# Patient Record
Sex: MALE | Race: WHITE | Employment: FULL TIME | URBAN - METROPOLITAN AREA
[De-identification: names, ages, dates, MRNs, and addresses within clinical notes are randomized per-mention and may not be internally consistent; named-entity substitution may affect disease eponyms.]

---

## 2017-09-18 ENCOUNTER — OFFICE VISIT (OUTPATIENT)
Dept: FAMILY MEDICINE CLINIC | Age: 57
End: 2017-09-18

## 2017-09-18 VITALS
DIASTOLIC BLOOD PRESSURE: 86 MMHG | SYSTOLIC BLOOD PRESSURE: 122 MMHG | RESPIRATION RATE: 12 BRPM | TEMPERATURE: 97.8 F | BODY MASS INDEX: 33.16 KG/M2 | WEIGHT: 244.8 LBS | HEART RATE: 86 BPM | HEIGHT: 72 IN | OXYGEN SATURATION: 98 %

## 2017-09-18 DIAGNOSIS — Z99.89 OSA ON CPAP: ICD-10-CM

## 2017-09-18 DIAGNOSIS — R63.5 WEIGHT GAIN: ICD-10-CM

## 2017-09-18 DIAGNOSIS — R53.82 CHRONIC FATIGUE: ICD-10-CM

## 2017-09-18 DIAGNOSIS — Z12.5 PROSTATE CANCER SCREENING: ICD-10-CM

## 2017-09-18 DIAGNOSIS — G47.33 OSA ON CPAP: ICD-10-CM

## 2017-09-18 DIAGNOSIS — Z13.220 SCREENING CHOLESTEROL LEVEL: ICD-10-CM

## 2017-09-18 DIAGNOSIS — Z00.00 PREVENTATIVE HEALTH CARE: Primary | ICD-10-CM

## 2017-09-18 LAB
A/G RATIO: 1.9 (ref 1.1–2.2)
ALBUMIN SERPL-MCNC: 4.5 G/DL (ref 3.4–5)
ALP BLD-CCNC: 83 U/L (ref 40–129)
ALT SERPL-CCNC: 41 U/L (ref 10–40)
ANION GAP SERPL CALCULATED.3IONS-SCNC: 13 MMOL/L (ref 3–16)
AST SERPL-CCNC: 24 U/L (ref 15–37)
BASOPHILS ABSOLUTE: 0.1 K/UL (ref 0–0.2)
BASOPHILS RELATIVE PERCENT: 0.9 %
BILIRUB SERPL-MCNC: 0.7 MG/DL (ref 0–1)
BUN BLDV-MCNC: 15 MG/DL (ref 7–20)
CALCIUM SERPL-MCNC: 9.3 MG/DL (ref 8.3–10.6)
CHLORIDE BLD-SCNC: 99 MMOL/L (ref 99–110)
CHOLESTEROL, TOTAL: 184 MG/DL (ref 0–199)
CO2: 26 MMOL/L (ref 21–32)
CREAT SERPL-MCNC: 0.8 MG/DL (ref 0.9–1.3)
EOSINOPHILS ABSOLUTE: 0.2 K/UL (ref 0–0.6)
EOSINOPHILS RELATIVE PERCENT: 3.5 %
GFR AFRICAN AMERICAN: >60
GFR NON-AFRICAN AMERICAN: >60
GLOBULIN: 2.4 G/DL
GLUCOSE BLD-MCNC: 112 MG/DL (ref 70–99)
HCT VFR BLD CALC: 46.4 % (ref 40.5–52.5)
HDLC SERPL-MCNC: 60 MG/DL (ref 40–60)
HEMOGLOBIN: 15.8 G/DL (ref 13.5–17.5)
LDL CHOLESTEROL CALCULATED: 102 MG/DL
LYMPHOCYTES ABSOLUTE: 1.2 K/UL (ref 1–5.1)
LYMPHOCYTES RELATIVE PERCENT: 18.8 %
MCH RBC QN AUTO: 31.5 PG (ref 26–34)
MCHC RBC AUTO-ENTMCNC: 34.2 G/DL (ref 31–36)
MCV RBC AUTO: 92.4 FL (ref 80–100)
MONOCYTES ABSOLUTE: 0.6 K/UL (ref 0–1.3)
MONOCYTES RELATIVE PERCENT: 9.1 %
NEUTROPHILS ABSOLUTE: 4.4 K/UL (ref 1.7–7.7)
NEUTROPHILS RELATIVE PERCENT: 67.7 %
PDW BLD-RTO: 13 % (ref 12.4–15.4)
PLATELET # BLD: 180 K/UL (ref 135–450)
PMV BLD AUTO: 8.7 FL (ref 5–10.5)
POTASSIUM SERPL-SCNC: 4.9 MMOL/L (ref 3.5–5.1)
PROSTATE SPECIFIC ANTIGEN: 0.53 NG/ML (ref 0–4)
RBC # BLD: 5.02 M/UL (ref 4.2–5.9)
SODIUM BLD-SCNC: 138 MMOL/L (ref 136–145)
T4 FREE: 1.1 NG/DL (ref 0.9–1.8)
TOTAL PROTEIN: 6.9 G/DL (ref 6.4–8.2)
TRIGL SERPL-MCNC: 110 MG/DL (ref 0–150)
TSH REFLEX: 4.06 UIU/ML (ref 0.27–4.2)
VLDLC SERPL CALC-MCNC: 22 MG/DL
WBC # BLD: 6.5 K/UL (ref 4–11)

## 2017-09-18 PROCEDURE — 99396 PREV VISIT EST AGE 40-64: CPT | Performed by: NURSE PRACTITIONER

## 2017-09-18 RX ORDER — MAG HYDROX/ALUMINUM HYD/SIMETH 400-400-40
SUSPENSION, ORAL (FINAL DOSE FORM) ORAL
COMMUNITY
End: 2018-03-21

## 2017-09-18 RX ORDER — BIOTIN 1 MG
TABLET ORAL
COMMUNITY
End: 2018-03-21

## 2017-09-18 RX ORDER — CALCIUM CARBONATE 500(1250)
500 TABLET ORAL DAILY
COMMUNITY
End: 2018-03-21

## 2017-09-18 ASSESSMENT — ENCOUNTER SYMPTOMS
BACK PAIN: 0
CHEST TIGHTNESS: 0
CONSTIPATION: 0
NAUSEA: 0
APNEA: 1
SHORTNESS OF BREATH: 0

## 2017-09-18 ASSESSMENT — PATIENT HEALTH QUESTIONNAIRE - PHQ9
2. FEELING DOWN, DEPRESSED OR HOPELESS: 0
SUM OF ALL RESPONSES TO PHQ QUESTIONS 1-9: 0
1. LITTLE INTEREST OR PLEASURE IN DOING THINGS: 0
SUM OF ALL RESPONSES TO PHQ9 QUESTIONS 1 & 2: 0
SUM OF ALL RESPONSES TO PHQ9 QUESTIONS 1 & 2: 0
SUM OF ALL RESPONSES TO PHQ QUESTIONS 1-9: 0
2. FEELING DOWN, DEPRESSED OR HOPELESS: 0
1. LITTLE INTEREST OR PLEASURE IN DOING THINGS: 0

## 2017-09-20 ENCOUNTER — TELEPHONE (OUTPATIENT)
Dept: FAMILY MEDICINE CLINIC | Age: 57
End: 2017-09-20

## 2017-12-08 ENCOUNTER — OFFICE VISIT (OUTPATIENT)
Dept: PULMONOLOGY | Age: 57
End: 2017-12-08

## 2017-12-08 VITALS
DIASTOLIC BLOOD PRESSURE: 100 MMHG | WEIGHT: 250 LBS | HEART RATE: 78 BPM | SYSTOLIC BLOOD PRESSURE: 158 MMHG | BODY MASS INDEX: 33.86 KG/M2 | RESPIRATION RATE: 16 BRPM | HEIGHT: 72 IN | OXYGEN SATURATION: 98 %

## 2017-12-08 DIAGNOSIS — Z99.89 OSA ON CPAP: Primary | ICD-10-CM

## 2017-12-08 DIAGNOSIS — G47.33 OSA ON CPAP: Primary | ICD-10-CM

## 2017-12-08 DIAGNOSIS — R03.0 ELEVATED BLOOD PRESSURE READING: ICD-10-CM

## 2017-12-08 DIAGNOSIS — Z71.89 CPAP USE COUNSELING: ICD-10-CM

## 2017-12-08 PROCEDURE — 99213 OFFICE O/P EST LOW 20 MIN: CPT | Performed by: NURSE PRACTITIONER

## 2017-12-08 ASSESSMENT — SLEEP AND FATIGUE QUESTIONNAIRES
HOW LIKELY ARE YOU TO NOD OFF OR FALL ASLEEP WHILE SITTING QUIETLY AFTER LUNCH WITHOUT ALCOHOL: 0
HOW LIKELY ARE YOU TO NOD OFF OR FALL ASLEEP WHILE SITTING AND TALKING TO SOMEONE: 0
HOW LIKELY ARE YOU TO NOD OFF OR FALL ASLEEP WHILE SITTING AND READING: 1
HOW LIKELY ARE YOU TO NOD OFF OR FALL ASLEEP WHILE LYING DOWN TO REST IN THE AFTERNOON WHEN CIRCUMSTANCES PERMIT: 1
HOW LIKELY ARE YOU TO NOD OFF OR FALL ASLEEP IN A CAR, WHILE STOPPED FOR A FEW MINUTES IN TRAFFIC: 0
HOW LIKELY ARE YOU TO NOD OFF OR FALL ASLEEP WHILE WATCHING TV: 0
HOW LIKELY ARE YOU TO NOD OFF OR FALL ASLEEP WHILE SITTING INACTIVE IN A PUBLIC PLACE: 0
ESS TOTAL SCORE: 2
HOW LIKELY ARE YOU TO NOD OFF OR FALL ASLEEP WHEN YOU ARE A PASSENGER IN A CAR FOR AN HOUR WITHOUT A BREAK: 0
NECK CIRCUMFERENCE (INCHES): 16

## 2017-12-08 NOTE — PATIENT INSTRUCTIONS
Please keep all of your future appointments scheduled by [address], [place] [facility] office. Sleep Hygiene. .. Tips for better sleep. .. Avoid naps. This will ensure you are sleepy at bedtime. If you have to take a nap, sleep less than 1 hour, before 3 pm.  Sleep only when sleepy; this reduces the time you are awake in bed. Regular exercise is recommended to help you deepen your sleep, but not within 4-6 hours of your bedtime. Timing of exercise is important, aim to exercise early in the morning or early afternoon. A light snack may help you fall asleep. Warm milk and foods high in the amino acid tryptophan, such as bananas, may help you to sleep  Be sure to avoid heavy, spicy or sugary foods 4-6 hours before bedtime and avoid at snack time. Stay away from stimulants such as caffeine and nicotine for at least 4-6 hours before bed. Stimulants can interfere with your ability to fall asleep. Caffeine is found in tea, cola, coffee, cocoa and chocolate and is best avoided at bedtime. Nicotine is found in tobacco products. Avoid alcohol 4-6 hours before bedtime. Alcohol has an immediate sleep-inducing effect, after a few hours when alcohol levels fall there is a stimulant or wake-up effect and will cause fragmented sleep. Sleep rituals are important. Give your body clues it is time to slow down and sleep. Examples include; yoga, deep breathing, listen to relaxing music, a hot bath or a few minutes of reading. Have a fixed bedtime and awakening time, Even on weekends! You will feel better keeping a regular sleep cycle, even if you are retired or not working. Get into your favorite sleep position. If not asleep in 30 minutes, get up and do something boring until you feel sleepy. Remember not to expose yourself to bright lights such as TV, phone or tablet screens. Only use your bed for sleeping. Do not use your bed as an office, workroom or recreation room. Use comfortable bedding. dryer  - Don't use any caustic or household cleaning solutions such as bleach on your CPAP   equipment.  - Do follow the recommended cleaning schedule. - Do change your disposable filter frequently. Adapted From: [email or web address]/cleaning. shtm.   These are general suggestions for all models please follow specific s recommendations and specific instructions

## 2017-12-08 NOTE — PROGRESS NOTES
COLONOSCOPY  07/25/2016    normal other than diverticulosis    HERNIA REPAIR      LAPAROSCOPY  08/15/2016    Lap repair incarcerated umbilical hernia with implantation 4.5\" diameter Ventralight ST mesh with dr Mery Gutiérrez       Allergies:  is allergic to pcn [penicillins]. Social History:    TOBACCO:   reports that he quit smoking about 6 years ago. His smoking use included Cigarettes. He has a 30.00 pack-year smoking history. He has never used smokeless tobacco.  ETOH:   reports that he drinks about 84.6 oz of alcohol per week . Family History:       Problem Relation Age of Onset    Cancer Sister      Breast    Thyroid Disease Sister     Cancer Mother      Colon    High Blood Pressure Mother     Alzheimer's Disease Father     Parkinsonism Father     High Blood Pressure Father     Emphysema Neg Hx     Heart Failure Neg Hx        Current Medications:    Current Outpatient Prescriptions:     Biotin 1000 MCG TABS, Take by mouth, Disp: , Rfl:     Saw [place] 450 MG CAPS, Take by mouth, Disp: , Rfl:     calcium carbonate (OSCAL) 500 MG TABS tablet, Take 500 mg by mouth daily, Disp: , Rfl:     aspirin 81 MG tablet, Take 81 mg by mouth daily, Disp: , Rfl:       Objective:   PHYSICAL EXAM:    BP (!) 158/100 (Site: Left Arm, Position: Sitting, Cuff Size: Medium Adult)   Pulse 78   Resp 16   Ht 6' (1.829 m)   Wt 250 lb (113.4 kg) Comment: Stated  SpO2 98%   BMI 33.91 kg/m²     Physical exam:  Gen: No distress. Obese. BMI of 33.20  Eyes: PERRL. No sclera icterus. No conjunctival injection. ENT: No discharge. Pharynx clear. Mallampati class IV. Large tongue   Neck: Trachea midline. No obvious mass. Neck circumference 16\"  Resp: No accessory muscle use. No crackles. No wheezes. No rhonchi. CV: Regular rate. Regular rhythm. No murmur or rub. Skin: Warm and dry. M/S: No cyanosis. No obvious joint deformity. Neuro: Awake. Alert. Moves all four extremities.    Psych:

## 2018-03-21 ENCOUNTER — OFFICE VISIT (OUTPATIENT)
Dept: FAMILY MEDICINE CLINIC | Age: 58
End: 2018-03-21

## 2018-03-21 VITALS
WEIGHT: 241.6 LBS | HEART RATE: 87 BPM | DIASTOLIC BLOOD PRESSURE: 78 MMHG | OXYGEN SATURATION: 97 % | RESPIRATION RATE: 16 BRPM | HEIGHT: 72 IN | SYSTOLIC BLOOD PRESSURE: 128 MMHG | BODY MASS INDEX: 32.72 KG/M2

## 2018-03-21 DIAGNOSIS — G47.33 OSA ON CPAP: ICD-10-CM

## 2018-03-21 DIAGNOSIS — Z99.89 OSA ON CPAP: ICD-10-CM

## 2018-03-21 DIAGNOSIS — L60.3 SPLITTING OF NAIL: ICD-10-CM

## 2018-03-21 DIAGNOSIS — R73.01 IMPAIRED FASTING GLUCOSE: Primary | ICD-10-CM

## 2018-03-21 LAB — HBA1C MFR BLD: 5.8 %

## 2018-03-21 PROCEDURE — 83036 HEMOGLOBIN GLYCOSYLATED A1C: CPT | Performed by: NURSE PRACTITIONER

## 2018-03-21 PROCEDURE — 99213 OFFICE O/P EST LOW 20 MIN: CPT | Performed by: NURSE PRACTITIONER

## 2018-03-21 ASSESSMENT — ENCOUNTER SYMPTOMS
CHEST TIGHTNESS: 0
NAUSEA: 0
SHORTNESS OF BREATH: 0
CONSTIPATION: 0

## 2018-03-21 ASSESSMENT — PATIENT HEALTH QUESTIONNAIRE - PHQ9
1. LITTLE INTEREST OR PLEASURE IN DOING THINGS: 0
2. FEELING DOWN, DEPRESSED OR HOPELESS: 0
SUM OF ALL RESPONSES TO PHQ9 QUESTIONS 1 & 2: 0
SUM OF ALL RESPONSES TO PHQ QUESTIONS 1-9: 0

## 2018-06-21 ENCOUNTER — OFFICE VISIT (OUTPATIENT)
Dept: FAMILY MEDICINE CLINIC | Age: 58
End: 2018-06-21

## 2018-06-21 VITALS
SYSTOLIC BLOOD PRESSURE: 116 MMHG | BODY MASS INDEX: 31.99 KG/M2 | OXYGEN SATURATION: 97 % | HEIGHT: 72 IN | DIASTOLIC BLOOD PRESSURE: 80 MMHG | RESPIRATION RATE: 15 BRPM | HEART RATE: 85 BPM | WEIGHT: 236.2 LBS

## 2018-06-21 DIAGNOSIS — R73.01 IMPAIRED FASTING GLUCOSE: Primary | ICD-10-CM

## 2018-06-21 DIAGNOSIS — F10.10 ALCOHOL ABUSE: ICD-10-CM

## 2018-06-21 DIAGNOSIS — E66.09 CLASS 1 OBESITY DUE TO EXCESS CALORIES WITH SERIOUS COMORBIDITY AND BODY MASS INDEX (BMI) OF 32.0 TO 32.9 IN ADULT: ICD-10-CM

## 2018-06-21 DIAGNOSIS — E03.9 ACQUIRED HYPOTHYROIDISM: ICD-10-CM

## 2018-06-21 PROBLEM — E66.811 CLASS 1 OBESITY DUE TO EXCESS CALORIES WITH SERIOUS COMORBIDITY AND BODY MASS INDEX (BMI) OF 32.0 TO 32.9 IN ADULT: Status: ACTIVE | Noted: 2018-06-21

## 2018-06-21 LAB — HBA1C MFR BLD: 5.5 %

## 2018-06-21 PROCEDURE — 83036 HEMOGLOBIN GLYCOSYLATED A1C: CPT | Performed by: NURSE PRACTITIONER

## 2018-06-21 PROCEDURE — 99213 OFFICE O/P EST LOW 20 MIN: CPT | Performed by: NURSE PRACTITIONER

## 2018-06-21 RX ORDER — BIOTIN 10000 MCG
CAPSULE ORAL
COMMUNITY
End: 2021-03-19

## 2018-06-21 ASSESSMENT — PATIENT HEALTH QUESTIONNAIRE - PHQ9
2. FEELING DOWN, DEPRESSED OR HOPELESS: 0
SUM OF ALL RESPONSES TO PHQ QUESTIONS 1-9: 0
1. LITTLE INTEREST OR PLEASURE IN DOING THINGS: 0
SUM OF ALL RESPONSES TO PHQ9 QUESTIONS 1 & 2: 0

## 2018-06-21 ASSESSMENT — ENCOUNTER SYMPTOMS
NAUSEA: 0
CHEST TIGHTNESS: 0
BACK PAIN: 0

## 2018-07-06 PROBLEM — F10.10 ALCOHOL ABUSE: Status: ACTIVE | Noted: 2018-07-06

## 2018-12-10 ENCOUNTER — OFFICE VISIT (OUTPATIENT)
Dept: PULMONOLOGY | Age: 58
End: 2018-12-10
Payer: COMMERCIAL

## 2018-12-10 ENCOUNTER — TELEPHONE (OUTPATIENT)
Dept: PULMONOLOGY | Age: 58
End: 2018-12-10

## 2018-12-10 VITALS
TEMPERATURE: 98.2 F | HEART RATE: 67 BPM | DIASTOLIC BLOOD PRESSURE: 78 MMHG | HEIGHT: 72 IN | OXYGEN SATURATION: 98 % | RESPIRATION RATE: 16 BRPM | BODY MASS INDEX: 32.78 KG/M2 | SYSTOLIC BLOOD PRESSURE: 136 MMHG | WEIGHT: 242 LBS

## 2018-12-10 DIAGNOSIS — E66.9 OBESITY (BMI 30.0-34.9): ICD-10-CM

## 2018-12-10 DIAGNOSIS — Z71.89 CPAP USE COUNSELING: ICD-10-CM

## 2018-12-10 DIAGNOSIS — Z99.89 OSA ON CPAP: Primary | ICD-10-CM

## 2018-12-10 DIAGNOSIS — G47.33 OSA ON CPAP: Primary | ICD-10-CM

## 2018-12-10 DIAGNOSIS — Z72.821 POOR SLEEP HYGIENE: ICD-10-CM

## 2018-12-10 PROBLEM — E66.811 OBESITY (BMI 30.0-34.9): Status: ACTIVE | Noted: 2018-12-10

## 2018-12-10 PROCEDURE — 99214 OFFICE O/P EST MOD 30 MIN: CPT | Performed by: NURSE PRACTITIONER

## 2018-12-10 ASSESSMENT — SLEEP AND FATIGUE QUESTIONNAIRES
HOW LIKELY ARE YOU TO NOD OFF OR FALL ASLEEP WHILE WATCHING TV: 1
HOW LIKELY ARE YOU TO NOD OFF OR FALL ASLEEP WHILE LYING DOWN TO REST IN THE AFTERNOON WHEN CIRCUMSTANCES PERMIT: 3
HOW LIKELY ARE YOU TO NOD OFF OR FALL ASLEEP WHILE SITTING INACTIVE IN A PUBLIC PLACE: 0
NECK CIRCUMFERENCE (INCHES): 16
HOW LIKELY ARE YOU TO NOD OFF OR FALL ASLEEP WHILE SITTING AND READING: 1
HOW LIKELY ARE YOU TO NOD OFF OR FALL ASLEEP WHILE SITTING AND TALKING TO SOMEONE: 0
HOW LIKELY ARE YOU TO NOD OFF OR FALL ASLEEP WHILE SITTING QUIETLY AFTER LUNCH WITHOUT ALCOHOL: 0
HOW LIKELY ARE YOU TO NOD OFF OR FALL ASLEEP WHEN YOU ARE A PASSENGER IN A CAR FOR AN HOUR WITHOUT A BREAK: 0
HOW LIKELY ARE YOU TO NOD OFF OR FALL ASLEEP IN A CAR, WHILE STOPPED FOR A FEW MINUTES IN TRAFFIC: 0
ESS TOTAL SCORE: 5

## 2018-12-10 NOTE — PROGRESS NOTES
arise.  - Sleep hygiene  - Avoid sedatives, alcohol and caffeinated drinks at bed time.  -Advised to cut down/stop daily alcohol use. Discussed effects on alcohol and sleep- initially can worsen JOAQUÍN, and then can cause decrease in total sleep time and increased wakefulness in the second half of sleep  - Patient counseled to never drive or operate heavy machinery while fatigue, drowsy or sleepy. - Weight loss is recommended as a long-term intervention.    - Complications of JOAQUÍN if not treated were discussed with patient patient, including: systemic hypertension, pulmonary hypertension, cardiovascular morbidities, car accidents and all cause mortality.  -Patient education handout provided regarding sleep tips and CPAP cleaning recommendations       Follow up:recommend 3 months, sooner if needed (pt prefers 6 months)    More than 25 minutes of time was spent in direct patient contact during this visit, more than 50% of which was spent counseling and/or coordinating care.

## 2018-12-10 NOTE — PATIENT INSTRUCTIONS
Please keep all of your future appointments scheduled by [address], [facility] ([facility]) Pulmonary and Sleep. Remember to bring your sleep machine, cord and mask to each appointment    You should have a follow up appointment scheduled with a sleep medicine provider within 12 months. Routine parts, masks, tubing and filters should all be obtainable from your DME (equipment) provider. Any problems related to mask fit, comfort or functioning of equipment should also be addressed directly with your DME provider. If you are unable to resolve problems, then please notify our office and schedule an appointment to be seen sooner than the usual follow up appointment. For all patients who are evaluated for sleep disorders, never drive or operate motorized equipment while sleepy, fatigued or groggy. Please bring in all of your sleep equipment to all of your appointments, including machine, mask, cords and hoses. Here are some tips to to getting better sleep  1- Avoid napping during the day: This will ensure you are tired at bedtime. If you have to take a nap, sleep less than one hour, before 3 pm.   2- Exercise regularly, but not right before bed: but the timing of the workout is important. Exercising in the morning or early afternoon will not interfere with sleep. Exercising within two hours before bedtime can decrease your ability to fall asleep. Regular exercise is recommended to help you deepen the sleep. 3- Avoid heavy, spicy, or sugary foods 4-6 hours before bedtime: These can affect your ability to stay asleep. 4- Have a light snack before bed: Having an empty stomach can interfere with your sleep. Dairy products and turkey contain tryptophan, which acts as a natural sleep inducer. 5- Stay away from caffeine, nicotine and alcohol at least 4-6 hours before bed: Caffeine and nicotine are stimulants that interfere with your ability to fall asleep.  While alcohol has an immediate sleep-inducing worries about job, school, daily life, etc., behind when you go to bed. Some people find it useful to assign a \"worry period\" during the evening or afternoon for these issues. CPAP Equipment Cleaning and Disinfecting Schedule  Equipment Cleaning Frequency Instructions  Disinfecting Frequency   Non-Disposable Filters  Weekly Mild soapy water, Rinse, Air Dry Not Required   Disposable Filters Change as needed  2-4 weeks Do Not Wash Not Required   Hose/tubing Daily Mild soapy water, Rinse, Air Dry Once a week   Mask / Nasal Pillows Daily Mild soapy water, Rinse, Air Dry Once a week   Headgear Weekly Hand wash, Mild soapy water, Rinse, Dry  Not Required   Humidifier Daily Empty water daily  Mild soapy water, Rinse well, Air Dry  Once a week   CPAP Unit As Needed Dust with damp cloth,  No detergents or sprays Not Required         Disinfect (per schedule) with 1 part white vinegar and 3 parts water- soak mask and water chamber for 30 minutes every 1-2 weeks, more often if sick. Allow water/vinegar mixture to run through tubing. Allow all equipment to air dry. Drying Hints:   Always hang tubing away from direct sunlight, as this will cause the tubing to become yellow, brittle and crack over a period of time. DO NOT attach the wet tubing to your CPAP unit to blow-dry it. The moisture from the tubing can drain back into your machine. Moisture in your unit can cause sudden pressure increases or short circuits  DO's and DON'Ts:  - Don't use alcohol-based products to clean your mask, because it can cause the materials to become hard and brittle. - Don't put headgear in the washer or dryer  - Don't use any caustic or household cleaning solutions such as bleach on your CPAP   equipment.  - Do follow the recommended cleaning schedule. - Do change your disposable filter frequently. Adapted From: [employer]/cleaning. shtm.   These are general suggestions for all models please follow specific s recommendations and specific instructions

## 2019-06-10 ENCOUNTER — OFFICE VISIT (OUTPATIENT)
Dept: PULMONOLOGY | Age: 59
End: 2019-06-10
Payer: COMMERCIAL

## 2019-06-10 VITALS
HEIGHT: 72 IN | TEMPERATURE: 98.4 F | SYSTOLIC BLOOD PRESSURE: 126 MMHG | RESPIRATION RATE: 16 BRPM | BODY MASS INDEX: 31.29 KG/M2 | HEART RATE: 67 BPM | DIASTOLIC BLOOD PRESSURE: 83 MMHG | WEIGHT: 231 LBS | OXYGEN SATURATION: 97 %

## 2019-06-10 DIAGNOSIS — Z71.89 CPAP USE COUNSELING: ICD-10-CM

## 2019-06-10 DIAGNOSIS — E66.9 OBESITY (BMI 30.0-34.9): ICD-10-CM

## 2019-06-10 DIAGNOSIS — G47.33 OSA ON CPAP: Primary | ICD-10-CM

## 2019-06-10 DIAGNOSIS — Z99.89 OSA ON CPAP: Primary | ICD-10-CM

## 2019-06-10 PROCEDURE — 99214 OFFICE O/P EST MOD 30 MIN: CPT | Performed by: NURSE PRACTITIONER

## 2019-06-10 ASSESSMENT — SLEEP AND FATIGUE QUESTIONNAIRES
HOW LIKELY ARE YOU TO NOD OFF OR FALL ASLEEP WHEN YOU ARE A PASSENGER IN A CAR FOR AN HOUR WITHOUT A BREAK: 0
HOW LIKELY ARE YOU TO NOD OFF OR FALL ASLEEP WHILE SITTING QUIETLY AFTER LUNCH WITHOUT ALCOHOL: 0
HOW LIKELY ARE YOU TO NOD OFF OR FALL ASLEEP WHILE LYING DOWN TO REST IN THE AFTERNOON WHEN CIRCUMSTANCES PERMIT: 3
HOW LIKELY ARE YOU TO NOD OFF OR FALL ASLEEP WHILE SITTING AND TALKING TO SOMEONE: 0
ESS TOTAL SCORE: 3
HOW LIKELY ARE YOU TO NOD OFF OR FALL ASLEEP IN A CAR, WHILE STOPPED FOR A FEW MINUTES IN TRAFFIC: 0
HOW LIKELY ARE YOU TO NOD OFF OR FALL ASLEEP WHILE SITTING AND READING: 0
HOW LIKELY ARE YOU TO NOD OFF OR FALL ASLEEP WHILE SITTING INACTIVE IN A PUBLIC PLACE: 0
NECK CIRCUMFERENCE (INCHES): 16
HOW LIKELY ARE YOU TO NOD OFF OR FALL ASLEEP WHILE WATCHING TV: 0

## 2019-06-10 NOTE — PATIENT INSTRUCTIONS
.Please keep all of your future appointments scheduled by [facility] office. Out of respect for other patients and providers, you may be asked to reschedule your appointment if you arrive later than your scheduled appointment time. Appointments cancelled less than 24hrs in advance will be considered a no show. Patients with three missed appointments within 1 year or four missed appointments within 2 years can be dismissed from the practice. Remember to bring your sleep machine, cord and mask to each appointment    You should have a follow up appointment scheduled with a sleep medicine provider within 12 months. Routine parts, masks, tubing and filters should all be obtainable from your DME (equipment) provider. Any problems related to mask fit, comfort or functioning of equipment should also be addressed directly with your DME provider. If you are unable to resolve problems, then please notify our office and schedule an appointment to be seen sooner than the usual follow up appointment. For all patients who are evaluated for sleep disorders, never drive or operate motorized equipment while sleepy, fatigued or groggy. Please bring in all of your sleep equipment to all of your appointments, including machine, mask, cords and hoses. Here are some tips to to getting better sleep  1- Avoid napping during the day: This will ensure you are tired at bedtime. If you have to take a nap, sleep less than one hour, before 3 pm.   2- Exercise regularly, but not right before bed: but the timing of the workout is important. Exercising in the morning or early afternoon will not interfere with sleep. Exercising within two hours before bedtime can decrease your ability to fall asleep. Regular exercise is recommended to help you deepen the sleep. 3- Avoid heavy, spicy, or sugary foods 4-6 hours before bedtime: These can affect your ability to stay asleep.   4- Have a light snack along with enough blankets to stay warm is recommended. Get a blackout shade or wear a slumber mask and wear earplugs or get a \"white noise\" machine for light and noise distractions. 14- Use sunlight to set your biological clock: When you get up in the morning, go outside and turn your face to the sun for 15 minutes. 13- Dont take your worries to bed: Leave worries about job, school, daily life, etc., behind when you go to bed. Some people find it useful to assign a \"worry period\" during the evening or afternoon for these issues. CPAP Equipment Cleaning and Disinfecting Schedule  Equipment Cleaning Frequency Instructions  Disinfecting Frequency   Non-Disposable Filters  Weekly Mild soapy water, Rinse, Air Dry Not Required   Disposable Filters Change as needed  2-4 weeks Do Not Wash Not Required   Hose/tubing Daily Mild soapy water, Rinse, Air Dry Once a week   Mask / Nasal Pillows Daily Mild soapy water, Rinse, Air Dry Once a week   Headgear Weekly Hand wash, Mild soapy water, Rinse, Dry  Not Required   Humidifier Daily Empty water daily  Mild soapy water, Rinse well, Air Dry  Once a week   CPAP Unit As Needed Dust with damp cloth,  No detergents or sprays Not Required         Disinfect (per schedule) with 1 part white vinegar and 3 parts water- soak mask and water chamber for 30 minutes every 1-2 weeks, more often if sick. Allow water/vinegar mixture to run through tubing. Allow all equipment to air dry. Drying Hints:   Always hang tubing away from direct sunlight, as this will cause the tubing to become yellow, brittle and crack over a period of time. DO NOT attach the wet tubing to your CPAP unit to blow-dry it. The moisture from the tubing can drain back into your machine. Moisture in your unit can cause sudden pressure increases or short circuits  DO's and DON'Ts:  - Don't use alcohol-based products to clean your mask, because it can cause the materials to become hard and brittle.    - Don't put

## 2019-06-10 NOTE — PROGRESS NOTES
Patient ID: Lourdes Babinski is a 63 y.o. male who is being seen today for   Chief Complaint   Patient presents with    Sleep Apnea     6 mo         HPI:     Lourdes Babinski is a 63 y.o. male in office for JOAQUÍN follow [facility]. States he is still not sleeping in the same room as his wife, which he sates was his main goal.  States the CPAP makes too many noises. CPAP compliance has increased since last visit. He did see Dr. Mor Moses, ENT to discuss Inspire and other surgical options however decided not to pursue that route. Patient is using CPAP 4-5 hrs/night. Using humidifier. No snoring on CPAP. The pressure is well tolerated. The mask is comfortable-full face mask. Some mask leak. No significant daytime sleepiness. No nodding off when driving. No dry nose or throat. No fatigue. Bedtime is MN-1 am pm and rise time is 6 am. Sleep onset is 5-10 minutes. Wakes up 0 times at night total. No nocturia. No naps during the day. No headache in am. No weight gain. 5-6 caffienated beverages during the day. Usually 2-3 beers most days a wek. ESS is 3. Previous HPI 12/10/18  Lourdes Babinski is a 63 y.o. male in office for JOAQUÍN follow up. States he has not been using CPAP much lately states can't sleep in same bed as his wife Beronica Howard it is making noises\". He is unsure if he is snoring or if mask is leaking- from what he is describing the mask is leaking. States he has always had mask fitting issues but is unsure what changed from last visit when he was compliant. He states the only reason he uses CPAP is to be able to sleep in the same bed as his wife. He would like to know other treatment options for CPAP. States he is also having trouble with DME company. Doesn't think he should have to pay so much for supplies. When using CPAP he is using the humidifier. The pressure is well tolerated. The mask is somewhat comfortable- full face F20 with mask liner. +mask leak. No significant daytime sleepiness.  No nodding off when driving. Minimal dry mouth. No fatigue. Bedtime is MN- 1 am and rise time is 7 am. Sleep onset is few minutes falls asleep on the couch and stays there. Wakes up 0 times at night total. 0-1 nocturia. It takes few minutes to fall back a sleep. Rare naps during the day. No headache in am. No weight gain. 3-6 caffienated beverages during the day. 2 beer. ESS is 5. Sleep Medicine 6/10/2019 12/10/2018 12/8/2017 12/27/2016 6/6/2016 12/17/2015   Sitting and reading 0 1 1 1 2 1   Watching TV 0 1 0 1 2 2   Sitting, inactive in a public place (e.g. a theatre or a meeting) 0 0 0 0 0 1   As a passenger in a car for an hour without a break 0 0 0 0 0 0   Lying down to rest in the afternoon when circumstances permit 3 3 1 2 2 3   Sitting and talking to someone 0 0 0 0 0 0   Sitting quietly after a lunch without alcohol 0 0 0 0 2 1   In a car, while stopped for a few minutes in traffic 0 0 0 0 0 0   Total score 3 5 2 4 8 8   Neck circumference 16 16 16 15.5 15.5 15.5       Past Medical History:  Past Medical History:   Diagnosis Date    Acquired hypothyroidism 11/6/2015    Alcohol abuse 7/6/2018    Class 1 obesity due to excess calories with serious comorbidity and body mass index (BMI) of 32.0 to 32.9 in adult 6/21/2018    JOAQUÍN on CPAP        Past Surgical History:        Procedure Laterality Date    COLONOSCOPY  07/25/2016    normal other than diverticulosis    HERNIA REPAIR      LAPAROSCOPY  08/15/2016    Lap repair incarcerated umbilical hernia with implantation 4.5\" diameter Ventralight ST mesh with dr Cinthia Driver       Allergies:  is allergic to pcn [penicillins]. Social History:    TOBACCO:   reports that he quit smoking about 7 years ago. His smoking use included cigarettes. He has a 30.00 pack-year smoking history. He has never used smokeless tobacco.  ETOH:   reports that he drinks about 84.6 oz of alcohol per week.     Family History:       Problem Relation Age of Onset 13-16 cm H2O    Compliance download report from 5/11/19 to 6/9/19 reviewed today by me and showed patient is using machine 4:58 hrs/night with 66.7% compliance and AHI 7.3 within this time frame. 20/30days with greater than 4 hours of machine use. 26/30 days with any CPAP use. Large leak 3 hr 16 min/nightly. 90% pressure 13.1 cm H20 13-16 cmH2O. Assessment:      · Severe JOAQUÍN-Auto CPAP 13-16 CM H2O improving compliance on review today, residual AHI 7.3 likely due to large mask leak  · CPAP mask leak  · Obesity    Plan:     -Continue auto CPAP 13-16 CM H2O  -Can send supply order to DME of patient's choice. -Mask fitting at DME to find mask with better fit- may need to try different mask if cannot get current mask sealed. Patient has no tried different mask yet. -Again discussed recommended cleaning and replacement of CPAP supplies  -Discussed severity of sleep apnea and importance of treatment/CPAP use  -Recommend at least 7, preferably 8 hours of sleep nightly. - Treatment options were discussed with patient including CPAP therapy, oral appliances, hypoglossal nerve stimulator, and upper airways surgery.   -Can send referral to ENT to discuss surgical options per patient preference-patient decided not to pursue surgical option at this point.    - Advised to use CPAP 6-8 hrs at night and during naps- continue to increase use. - Replacement of mask, tubing, head straps every 3-6 months or sooner if damaged. - Patient instructed to contact DME company for any mask, tubing or machine trouble shooting if problems arise.  - Sleep hygiene  - Avoid sedatives, alcohol and caffeinated drinks at bed time.  -Advised to cut down/stop daily alcohol use. Discussed effects on alcohol and sleep- initially can worsen JOAQUÍN, and then can cause decrease in total sleep time and increased wakefulness in the second half of sleep  - Patient counseled to never drive or operate heavy machinery while fatigue, drowsy or sleepy. - Weight loss is recommended as a long-term intervention.    - Complications of JOAQUÍN if not treated were discussed with patient patient, including: systemic hypertension, pulmonary hypertension, cardiovascular morbidities, car accidents and all cause mortality.  -Patient education handout provided regarding sleep tips and CPAP cleaning recommendations       Follow up: 6 months, sooner if needed    More than 25 minutes of time was spent in direct patient contact during this visit, more than 50% of which was spent counseling and/or coordinating care.

## 2019-06-10 NOTE — PROGRESS NOTES
Orders verbalized by Stuart Aguilar CNP;  6 mo  Orders faxed to DME: FF mask, mask fitting  To Call w/ DME Co. Hip pain, acute, left SOB (shortness of breath)

## 2020-01-15 ENCOUNTER — TELEPHONE (OUTPATIENT)
Dept: PULMONOLOGY | Age: 60
End: 2020-01-15

## 2020-02-10 ENCOUNTER — OFFICE VISIT (OUTPATIENT)
Dept: FAMILY MEDICINE CLINIC | Age: 60
End: 2020-02-10
Payer: COMMERCIAL

## 2020-02-10 VITALS
DIASTOLIC BLOOD PRESSURE: 88 MMHG | TEMPERATURE: 98.2 F | RESPIRATION RATE: 16 BRPM | SYSTOLIC BLOOD PRESSURE: 128 MMHG | OXYGEN SATURATION: 98 % | WEIGHT: 239.2 LBS | BODY MASS INDEX: 32.4 KG/M2 | HEART RATE: 75 BPM | HEIGHT: 72 IN

## 2020-02-10 LAB
A/G RATIO: 2.6 (ref 1.1–2.2)
ALBUMIN SERPL-MCNC: 4.6 G/DL (ref 3.4–5)
ALP BLD-CCNC: 90 U/L (ref 40–129)
ALT SERPL-CCNC: 32 U/L (ref 10–40)
ANION GAP SERPL CALCULATED.3IONS-SCNC: 14 MMOL/L (ref 3–16)
AST SERPL-CCNC: 22 U/L (ref 15–37)
BILIRUB SERPL-MCNC: 0.8 MG/DL (ref 0–1)
BUN BLDV-MCNC: 13 MG/DL (ref 7–20)
CALCIUM SERPL-MCNC: 9.2 MG/DL (ref 8.3–10.6)
CHLORIDE BLD-SCNC: 106 MMOL/L (ref 99–110)
CHOLESTEROL, TOTAL: 155 MG/DL (ref 0–199)
CO2: 25 MMOL/L (ref 21–32)
CREAT SERPL-MCNC: 0.7 MG/DL (ref 0.9–1.3)
GFR AFRICAN AMERICAN: >60
GFR NON-AFRICAN AMERICAN: >60
GLOBULIN: 1.8 G/DL
GLUCOSE BLD-MCNC: 106 MG/DL (ref 70–99)
HDLC SERPL-MCNC: 59 MG/DL (ref 40–60)
LDL CHOLESTEROL CALCULATED: 86 MG/DL
POTASSIUM REFLEX MAGNESIUM: 4.7 MMOL/L (ref 3.5–5.1)
SODIUM BLD-SCNC: 145 MMOL/L (ref 136–145)
TOTAL PROTEIN: 6.4 G/DL (ref 6.4–8.2)
TRIGL SERPL-MCNC: 50 MG/DL (ref 0–150)
TSH SERPL DL<=0.05 MIU/L-ACNC: 3.65 UIU/ML (ref 0.27–4.2)
VLDLC SERPL CALC-MCNC: 10 MG/DL

## 2020-02-10 PROCEDURE — 99396 PREV VISIT EST AGE 40-64: CPT | Performed by: NURSE PRACTITIONER

## 2020-02-10 RX ORDER — MULTIVIT-MIN/IRON/FOLIC ACID/K 18-600-40
1 CAPSULE ORAL 2 TIMES DAILY
COMMUNITY
End: 2021-03-19

## 2020-02-10 RX ORDER — UBIDECARENONE 60 MG
CAPSULE ORAL
COMMUNITY
End: 2021-03-19

## 2020-02-10 RX ORDER — CYANOCOBALAMIN (VITAMIN B-12) 500 MCG
TABLET ORAL
COMMUNITY
End: 2021-03-19

## 2020-02-10 ASSESSMENT — PATIENT HEALTH QUESTIONNAIRE - PHQ9
1. LITTLE INTEREST OR PLEASURE IN DOING THINGS: 0
2. FEELING DOWN, DEPRESSED OR HOPELESS: 0
SUM OF ALL RESPONSES TO PHQ9 QUESTIONS 1 & 2: 0
SUM OF ALL RESPONSES TO PHQ QUESTIONS 1-9: 0
SUM OF ALL RESPONSES TO PHQ QUESTIONS 1-9: 0

## 2020-02-10 ASSESSMENT — ENCOUNTER SYMPTOMS
TROUBLE SWALLOWING: 0
CONSTIPATION: 0
APNEA: 1
CHEST TIGHTNESS: 0
NAUSEA: 0
BACK PAIN: 0
DIARRHEA: 0
SHORTNESS OF BREATH: 0

## 2020-02-10 NOTE — PROGRESS NOTES
2/10/2020    Edilma Vance (:  1960) is a 63 y.o. male, here for a preventive medicine evaluation. Patient Active Problem List   Diagnosis    Acquired hypothyroidism    JOAQUÍN on CPAP    Chronic fatigue    Weight gain    Impaired fasting glucose    Class 1 obesity due to excess calories with serious comorbidity and body mass index (BMI) of 32.0 to 32.9 in adult    Alcohol abuse    Obesity (BMI 30.0-34. 9)       Review of Systems   Constitutional: Negative for chills, fever and unexpected weight change. HENT: Negative for trouble swallowing. Respiratory: Positive for apnea. Negative for chest tightness and shortness of breath. Using C pap   Cardiovascular: Negative for chest pain and palpitations. Gastrointestinal: Negative for constipation, diarrhea and nausea. Genitourinary: Negative for dysuria. Musculoskeletal: Negative for arthralgias, back pain and gait problem. Neurological: Positive for headaches. Negative for dizziness and seizures. Psychiatric/Behavioral: Negative. Prior to Visit Medications    Medication Sig Taking?  Authorizing Provider   Coenzyme Q10 (CO Q 10) 60 MG CAPS Take by mouth Yes Historical Provider, MD   Cyanocobalamin (VITAMIN B 12) 500 MCG TABS Take by mouth Yes Historical Provider, MD   Cholecalciferol (VITAMIN D) 50 MCG (2000 UT) CAPS capsule Take 1 capsule by mouth 2 times daily Yes Historical Provider, MD   Biotin 10 MG CAPS Take by mouth 1500 mcg daily Yes Historical Provider, MD   aspirin 81 MG tablet Take 81 mg by mouth daily Three times a week Yes Historical Provider, MD        Allergies   Allergen Reactions    Pcn [Penicillins] Rash       Past Medical History:   Diagnosis Date    Acquired hypothyroidism 2015    Alcohol abuse 2018    Class 1 obesity due to excess calories with serious comorbidity and body mass index (BMI) of 32.0 to 32.9 in adult 2018    JOAQUÍN on CPAP        Past Surgical History:   Procedure Problem Relation Age of Onset    Cancer Sister         Breast    Thyroid Disease Sister     Cancer Mother         pancreatic    High Blood Pressure Mother     Alzheimer's Disease Father     Parkinsonism Father     High Blood Pressure Father     Emphysema Neg Hx     Heart Failure Neg Hx        ADVANCE DIRECTIVE: N, Not Received    Vitals:    02/10/20 1423   BP: 128/88   Site: Right Upper Arm   Position: Sitting   Cuff Size: Medium Adult   Pulse: 75   Resp: 16   Temp: 98.2 °F (36.8 °C)   TempSrc: Oral   SpO2: 98%   Weight: 239 lb 3.2 oz (108.5 kg)   Height: 6' (1.829 m)     Estimated body mass index is 32.44 kg/m² as calculated from the following:    Height as of this encounter: 6' (1.829 m). Weight as of this encounter: 239 lb 3.2 oz (108.5 kg). Physical Exam  Constitutional:       General: He is not in acute distress. Appearance: Normal appearance. He is well-developed. Neck:      Vascular: No carotid bruit. Cardiovascular:      Rate and Rhythm: Normal rate and regular rhythm. Pulses: Normal pulses. Heart sounds: Normal heart sounds. Pulmonary:      Effort: Pulmonary effort is normal.      Breath sounds: Normal breath sounds. Abdominal:      General: Bowel sounds are normal.      Palpations: Abdomen is soft. Musculoskeletal: Normal range of motion. Skin:     General: Skin is warm and dry. Neurological:      Mental Status: He is alert and oriented to person, place, and time. No flowsheet data found.     Lab Results   Component Value Date    CHOL 184 09/18/2017    CHOL 172 11/05/2015    CHOL 173 08/27/2015    TRIG 110 09/18/2017    TRIG 58 11/05/2015    TRIG 51 08/27/2015    HDL 60 09/18/2017    HDL 77 11/05/2015    HDL 60 08/27/2015    LDLCALC 102 09/18/2017    LDLCALC 83 11/05/2015    LDLCALC 103 08/27/2015    GLUCOSE 112 09/18/2017    LABA1C 5.5 06/21/2018    LABA1C 5.8 03/21/2018    LABA1C 5.4 11/05/2015       The 10-year ASCVD risk score (Yair Bryson et al.,

## 2020-02-11 LAB
ESTIMATED AVERAGE GLUCOSE: 114 MG/DL
HBA1C MFR BLD: 5.6 %

## 2020-04-20 ENCOUNTER — TELEPHONE (OUTPATIENT)
Dept: PULMONOLOGY | Age: 60
End: 2020-04-20

## 2020-08-19 ENCOUNTER — TELEPHONE (OUTPATIENT)
Dept: PULMONOLOGY | Age: 60
End: 2020-08-19

## 2020-08-19 NOTE — TELEPHONE ENCOUNTER
Patient cancelled appointment on 8/28/20 with Solo Aguilar CNP for 6 month sleep fu. Reason: patient wants to wait until this is all over to come in     Patient did not reschedule appointment. Last OV 6/10/19    Assessment:       · Severe JOAQUÍN-Auto CPAP 13-16 CM H2O improving compliance on review today, residual AHI 7.3 likely due to large mask leak  · CPAP mask leak  · Obesity     Plan:     -Continue auto CPAP 13-16 CM H2O  -Can send supply order to DME of patient's choice. -Mask fitting at DME to find mask with better fit- may need to try different mask if cannot get current mask sealed. Patient has no tried different mask yet. -Again discussed recommended cleaning and replacement of CPAP supplies  -Discussed severity of sleep apnea and importance of treatment/CPAP use  -Recommend at least 7, preferably 8 hours of sleep nightly. - Treatment options were discussed with patient including CPAP therapy, oral appliances, hypoglossal nerve stimulator, and upper airways surgery.   -Can send referral to ENT to discuss surgical options per patient preference-patient decided not to pursue surgical option at this point.    - Advised to use CPAP 6-8 hrs at night and during naps- continue to increase use. - Replacement of mask, tubing, head straps every 3-6 months or sooner if damaged. - Patient instructed to contact DME company for any mask, tubing or machine trouble shooting if problems arise.  - Sleep hygiene  - Avoid sedatives, alcohol and caffeinated drinks at bed time.  -Advised to cut down/stop daily alcohol use. Discussed effects on alcohol and sleep- initially can worsen JOAQUÍN, and then can cause decrease in total sleep time and increased wakefulness in the second half of sleep  - Patient counseled to never drive or operate heavy machinery while fatigue, drowsy or sleepy.    - Weight loss is recommended as a long-term intervention.    - Complications of JOAQUÍN if not treated were discussed with patient patient, including: systemic hypertension, pulmonary hypertension, cardiovascular morbidities, car accidents and all cause mortality.  -Patient education handout provided regarding sleep tips and CPAP cleaning recommendations

## 2021-03-19 ENCOUNTER — OFFICE VISIT (OUTPATIENT)
Dept: FAMILY MEDICINE CLINIC | Age: 61
End: 2021-03-19
Payer: COMMERCIAL

## 2021-03-19 VITALS
DIASTOLIC BLOOD PRESSURE: 78 MMHG | HEIGHT: 70 IN | WEIGHT: 241.4 LBS | BODY MASS INDEX: 34.56 KG/M2 | TEMPERATURE: 97.4 F | SYSTOLIC BLOOD PRESSURE: 144 MMHG | HEART RATE: 79 BPM | OXYGEN SATURATION: 96 %

## 2021-03-19 DIAGNOSIS — M79.672 PAIN IN BOTH FEET: Primary | ICD-10-CM

## 2021-03-19 DIAGNOSIS — G47.33 OSA ON CPAP: ICD-10-CM

## 2021-03-19 DIAGNOSIS — M79.671 PAIN IN BOTH FEET: Primary | ICD-10-CM

## 2021-03-19 DIAGNOSIS — Z99.89 OSA ON CPAP: ICD-10-CM

## 2021-03-19 DIAGNOSIS — R73.01 IMPAIRED FASTING GLUCOSE: ICD-10-CM

## 2021-03-19 LAB — HBA1C MFR BLD: 5.7 %

## 2021-03-19 PROCEDURE — 99213 OFFICE O/P EST LOW 20 MIN: CPT | Performed by: NURSE PRACTITIONER

## 2021-03-19 PROCEDURE — 83036 HEMOGLOBIN GLYCOSYLATED A1C: CPT | Performed by: NURSE PRACTITIONER

## 2021-03-19 ASSESSMENT — PATIENT HEALTH QUESTIONNAIRE - PHQ9
SUM OF ALL RESPONSES TO PHQ QUESTIONS 1-9: 0
SUM OF ALL RESPONSES TO PHQ9 QUESTIONS 1 & 2: 0
SUM OF ALL RESPONSES TO PHQ QUESTIONS 1-9: 0
1. LITTLE INTEREST OR PLEASURE IN DOING THINGS: 0

## 2021-03-19 NOTE — PROGRESS NOTES
Janessa Hagen (:  1960) is a 62 y.o. male,Established patient, here for evaluation of the following chief complaint(s): Foot Pain      ASSESSMENT/PLAN:  1. Pain in both feet  -     Amb External Referral To Podiatry  2. JOAQUÍN on CPAP  -     Thania Schmidt MD, Pulmonary, [facility]  -     DME Order for [facility]) as OP  3. Impaired fasting glucose  -     POCT glycosylated hemoglobin (Hb A1C)    Will ordered filter to [employer] to try to obtain until established with another sleep provider. No follow-ups on file. SUBJECTIVE/OBJECTIVE:  HPI         Occ having foot problems. One time was at home without shoes on and when occurs makes him take the weight off his feet. Feels shoes fit very comfortable. On feet 80% of work day. Denies occurring when standing to walk. Sudden pain under front of foot. Pain subsides with taking weight from feet. Walks \"gingerly\" to lessen the pain. Then symptoms will subside. Burning pain. Occurred on and off for some times. Declined to try arch supports. Declines medication related to not occurring on daily basis. Following with sleep provider until virtual visits were requested. Declines to manage care with [facility]. Wishes to be seen by different provider. Now in need to filters for his c pap. [employer] c pap machine disposable filter. 2 times monthly changes. Review of Systems   All other systems reviewed and are negative. Physical Exam  Constitutional:       Appearance: Normal appearance. Pulmonary:      Effort: Pulmonary effort is normal.   Musculoskeletal: Normal range of motion. Comments: Feet negative for calloses being note. Non tender to palpation bottom of feet. Indicates area from base of metatarsals to toes to be area of burning sensation. Right great nail growing severely inward, opaque. 2+ PT and PP bilateral. Neg for discoloration, swelling. Skin:     General: Skin is warm and dry.    Neurological:      Mental Status: He is oriented to person, place, and time.                  An electronic signature was used to authenticate this note.    --KAITLYNN VARGAS - CNP

## 2021-04-30 ENCOUNTER — OFFICE VISIT (OUTPATIENT)
Dept: PULMONOLOGY | Age: 61
End: 2021-04-30

## 2021-04-30 ENCOUNTER — TELEPHONE (OUTPATIENT)
Dept: PULMONOLOGY | Age: 61
End: 2021-04-30

## 2021-04-30 VITALS
RESPIRATION RATE: 19 BRPM | DIASTOLIC BLOOD PRESSURE: 76 MMHG | BODY MASS INDEX: 32.37 KG/M2 | TEMPERATURE: 97.9 F | HEART RATE: 64 BPM | OXYGEN SATURATION: 98 % | WEIGHT: 239 LBS | SYSTOLIC BLOOD PRESSURE: 128 MMHG | HEIGHT: 72 IN

## 2021-04-30 DIAGNOSIS — Z71.89 CPAP USE COUNSELING: ICD-10-CM

## 2021-04-30 DIAGNOSIS — E66.9 OBESITY (BMI 30.0-34.9): ICD-10-CM

## 2021-04-30 DIAGNOSIS — G47.33 SEVERE OBSTRUCTIVE SLEEP APNEA: Primary | ICD-10-CM

## 2021-04-30 PROCEDURE — 99213 OFFICE O/P EST LOW 20 MIN: CPT | Performed by: NURSE PRACTITIONER

## 2021-04-30 ASSESSMENT — SLEEP AND FATIGUE QUESTIONNAIRES
HOW LIKELY ARE YOU TO NOD OFF OR FALL ASLEEP WHILE SITTING INACTIVE IN A PUBLIC PLACE: 0
ESS TOTAL SCORE: 2
HOW LIKELY ARE YOU TO NOD OFF OR FALL ASLEEP WHILE WATCHING TV: 1
HOW LIKELY ARE YOU TO NOD OFF OR FALL ASLEEP IN A CAR, WHILE STOPPED FOR A FEW MINUTES IN TRAFFIC: 0
HOW LIKELY ARE YOU TO NOD OFF OR FALL ASLEEP WHILE SITTING QUIETLY AFTER LUNCH WITHOUT ALCOHOL: 0
HOW LIKELY ARE YOU TO NOD OFF OR FALL ASLEEP WHEN YOU ARE A PASSENGER IN A CAR FOR AN HOUR WITHOUT A BREAK: 0

## 2021-04-30 NOTE — PATIENT INSTRUCTIONS
recommended cleaning schedule. - Do change your disposable filter frequently. Adapted From: [email or web address]. shtm.   These are general suggestions for all models please follow specific s recommendations and specific instructions

## 2021-04-30 NOTE — PROGRESS NOTES
Patient ID: Jacek James is a 62 y.o. male who is being seen today for   No chief complaint on file. HPI:     Jacek James is a 62 y.o. male in office for JOAQUÍN follow up. States he is doing okay with CPAP. Patient is using CPAP   5-6 hrs/night. Using humidifier. No snoring on CPAP. The pressure is well tolerated. The mask is comfortable- full face F20. No mask leak. No significant daytime sleepiness. No nodding off when driving. No dry nose or throat. No fatigue. Works 2nd shift. Falls asleep watching TV around MN then to bed at 2 am, rise time is 7-730 am. Sleep onset is few minutes. Wakes up 0 times at night total. No nocturia. No naps during the day. No headache in am. No weight gain. 24-40 oz caffienated beverages during the day. 1-2 beer most nights. ESS is 2. Previous HPI 6/10/19  Jacek James is a 62 y.o. male in office for JOAQUÍN follow uip. States he is still not sleeping in the same room as his wife, which he sates was his main goal.  States the CPAP makes too many noises. CPAP compliance has increased since last visit. He did see Dr. Rosmery Townsend, ENT to discuss Inspire and other surgical options however decided not to pursue that route. Patient is using CPAP 4-5 hrs/night. Using humidifier. No snoring on CPAP. The pressure is well tolerated. The mask is comfortable-full face mask. Some mask leak. No significant daytime sleepiness. No nodding off when driving. No dry nose or throat. No fatigue. Bedtime is MN-1 am pm and rise time is 6 am. Sleep onset is 5-10 minutes. Wakes up 0 times at night total. No nocturia. No naps during the day. No headache in am. No weight gain. 5-6 caffienated beverages during the day. Usually 2-3 beers most days a wek. ESS is 3. Previous HPI 12/10/18  Jacek James is a 62 y.o. male in office for JOAQUÍN follow up. States he has not been using CPAP much lately states can't sleep in same bed as his wife Luz Maria Bates it is making noises\".   He is unsure if he is CPAP        Past Surgical History:        Procedure Laterality Date    COLONOSCOPY  07/25/2016    normal other than diverticulosis    HERNIA REPAIR      LAPAROSCOPY  08/15/2016    Lap repair incarcerated umbilical hernia with implantation 4.5\" diameter Ventralight ST mesh with dr Radha Rojas       Allergies:  is allergic to pcn [penicillins]. Social History:    TOBACCO:   reports that he quit smoking about 9 years ago. His smoking use included cigarettes. He has a 30.00 pack-year smoking history. He has never used smokeless tobacco.  ETOH:   reports current alcohol use of about 141.0 standard drinks of alcohol per week. Family History:       Problem Relation Age of Onset    Cancer Sister         Breast    Thyroid Disease Sister     Cancer Mother         pancreatic    High Blood Pressure Mother     Alzheimer's Disease Father     Parkinsonism Father     High Blood Pressure Father     Emphysema Neg Hx     Heart Failure Neg Hx        Current Medications:    Current Outpatient Medications:     psyllium (METAMUCIL) 28 % packet, Take 1 packet by mouth daily as needed Take with full glass of h20 or juice, Disp: , Rfl:       Objective:   PHYSICAL EXAM:    /76   Pulse 64   Temp 97.9 °F (36.6 °C)   Resp 19   Ht 6' (1.829 m)   Wt 239 lb (108.4 kg)   SpO2 98% Comment: RA  BMI 32.41 kg/m²     Physical exam:  Gen: No distress. Obese. BMI of 32.41  Eyes: PERRL. No sclera icterus. No conjunctival injection. ENT: No discharge. Pharynx clear. Mallampati class IV. Large tongue   Neck: Trachea midline. No obvious mass. Neck circumference 16.5\"  Resp: No accessory muscle use. No crackles. No wheezes. No rhonchi. CV: Regular rate. Regular rhythm. No murmur or rub. Skin: Warm and dry. M/S: No cyanosis. No obvious joint deformity. Neuro: Awake. Alert. Moves all four extremities. Psych: Oriented x 3. No anxiety.        DATA:  Split night PSG 1/21/16- AHI 76 and desaturation to 76%. CPAP titration showed improved sleep related breathing with CPAP. Recommendation of auto CPAP 13-17 cm H2O    CPAP compliance data:  Compliance download report from 11/8/17 to 12/7/17  showed patient is using machine 5:30 hrs/night with 90% compliance and AHI 1.6 within this time frame. 27/30days with greater than 4 hours of machine use. 90% pressure 13.9 cm H20 on auto CPAP 13-16 cm H2O    Compliance download report from 10/4/18 to 11/2/18 showed patient is using machine 4:09 hrs/night with 13.3% compliance and AHI 2.6 within this time frame. 4/30days with greater than 4 hours of machine use. Large leak 1 hr 29 min nightly. 90% pressure 13.6 cm H20 on auto CPAP 13-16 cm H2O    Compliance download report from 5/11/19 to 6/9/19 reviewed today by me and showed patient is using machine 4:58 hrs/night with 66.7% compliance and AHI 7.3 within this time frame. 20/30days with greater than 4 hours of machine use. 26/30 days with any CPAP use. Large leak 3 hr 16 min/nightly. 90% pressure 13.1 cm H20 13-16 cmH2O.    4/30/21 Unable to download compliance report today. Information obtained from CPAP at bedside and showed patient is using machine 4.9 hrs/night and AHI 1.9 within this time frame. 22/30days with greater than 4 hours of machine use. 90% pressure 15.4 cm H20 on auto CPAP 13-16    Assessment:      · Severe JOAQUÍN-Auto CPAP 1316 CM H2O Optimal compliance and efficacy on review today. · Daily alcohol use  · Obesity    Plan:     -Continue auto CPAP 1316 CM H2O  -Can send supply order to DME of patient's choice.    -Again discussed recommended cleaning and replacement of CPAP supplies  -Discussed severity of sleep apnea and importance of treatment/CPAP use  -Recommend at least 7, preferably 8 hours of sleep nightly-recommend sleep in bed all night, can set alarm if needed. - Advised to use CPAP 6-8 hrs at night and during naps- continue to increase use.   - Replacement of mask,

## 2022-03-25 ENCOUNTER — OFFICE VISIT (OUTPATIENT)
Dept: FAMILY MEDICINE CLINIC | Age: 62
End: 2022-03-25
Payer: COMMERCIAL

## 2022-03-25 VITALS
SYSTOLIC BLOOD PRESSURE: 118 MMHG | BODY MASS INDEX: 33.67 KG/M2 | DIASTOLIC BLOOD PRESSURE: 70 MMHG | OXYGEN SATURATION: 97 % | HEIGHT: 72 IN | HEART RATE: 76 BPM | WEIGHT: 248.6 LBS

## 2022-03-25 DIAGNOSIS — R73.01 IMPAIRED FASTING GLUCOSE: ICD-10-CM

## 2022-03-25 DIAGNOSIS — Z12.5 PROSTATE CANCER SCREENING: ICD-10-CM

## 2022-03-25 DIAGNOSIS — F10.10 ALCOHOL ABUSE: ICD-10-CM

## 2022-03-25 DIAGNOSIS — Z00.00 ENCOUNTER FOR WELL ADULT EXAM WITHOUT ABNORMAL FINDINGS: Primary | ICD-10-CM

## 2022-03-25 LAB
A/G RATIO: 2.5 (ref 1.1–2.2)
ALBUMIN SERPL-MCNC: 4.7 G/DL (ref 3.4–5)
ALP BLD-CCNC: 90 U/L (ref 40–129)
ALT SERPL-CCNC: 26 U/L (ref 10–40)
ANION GAP SERPL CALCULATED.3IONS-SCNC: 15 MMOL/L (ref 3–16)
AST SERPL-CCNC: 16 U/L (ref 15–37)
BILIRUB SERPL-MCNC: 0.4 MG/DL (ref 0–1)
BUN BLDV-MCNC: 18 MG/DL (ref 7–20)
CALCIUM SERPL-MCNC: 9.5 MG/DL (ref 8.3–10.6)
CHLORIDE BLD-SCNC: 106 MMOL/L (ref 99–110)
CO2: 20 MMOL/L (ref 21–32)
CREAT SERPL-MCNC: 0.9 MG/DL (ref 0.8–1.3)
GFR AFRICAN AMERICAN: >60
GFR NON-AFRICAN AMERICAN: >60
GLUCOSE BLD-MCNC: 113 MG/DL (ref 70–99)
POTASSIUM REFLEX MAGNESIUM: 5.1 MMOL/L (ref 3.5–5.1)
PROSTATE SPECIFIC ANTIGEN: 0.73 NG/ML (ref 0–4)
SODIUM BLD-SCNC: 141 MMOL/L (ref 136–145)
TOTAL PROTEIN: 6.6 G/DL (ref 6.4–8.2)

## 2022-03-25 PROCEDURE — 99396 PREV VISIT EST AGE 40-64: CPT | Performed by: NURSE PRACTITIONER

## 2022-03-25 SDOH — ECONOMIC STABILITY: HOUSING INSECURITY
IN THE LAST 12 MONTHS, WAS THERE A TIME WHEN YOU DID NOT HAVE A STEADY PLACE TO SLEEP OR SLEPT IN A SHELTER (INCLUDING NOW)?: NO

## 2022-03-25 SDOH — ECONOMIC STABILITY: FOOD INSECURITY: WITHIN THE PAST 12 MONTHS, YOU WORRIED THAT YOUR FOOD WOULD RUN OUT BEFORE YOU GOT MONEY TO BUY MORE.: NEVER TRUE

## 2022-03-25 SDOH — ECONOMIC STABILITY: INCOME INSECURITY: IN THE LAST 12 MONTHS, WAS THERE A TIME WHEN YOU WERE NOT ABLE TO PAY THE MORTGAGE OR RENT ON TIME?: NO

## 2022-03-25 SDOH — ECONOMIC STABILITY: FOOD INSECURITY: WITHIN THE PAST 12 MONTHS, THE FOOD YOU BOUGHT JUST DIDN'T LAST AND YOU DIDN'T HAVE MONEY TO GET MORE.: NEVER TRUE

## 2022-03-25 SDOH — ECONOMIC STABILITY: HOUSING INSECURITY: IN THE LAST 12 MONTHS, HOW MANY PLACES HAVE YOU LIVED?: 1

## 2022-03-25 SDOH — ECONOMIC STABILITY: TRANSPORTATION INSECURITY
IN THE PAST 12 MONTHS, HAS THE LACK OF TRANSPORTATION KEPT YOU FROM MEDICAL APPOINTMENTS OR FROM GETTING MEDICATIONS?: NO

## 2022-03-25 SDOH — ECONOMIC STABILITY: TRANSPORTATION INSECURITY
IN THE PAST 12 MONTHS, HAS LACK OF TRANSPORTATION KEPT YOU FROM MEETINGS, WORK, OR FROM GETTING THINGS NEEDED FOR DAILY LIVING?: NO

## 2022-03-25 ASSESSMENT — PATIENT HEALTH QUESTIONNAIRE - PHQ9
1. LITTLE INTEREST OR PLEASURE IN DOING THINGS: 0
SUM OF ALL RESPONSES TO PHQ QUESTIONS 1-9: 0
SUM OF ALL RESPONSES TO PHQ9 QUESTIONS 1 & 2: 0
2. FEELING DOWN, DEPRESSED OR HOPELESS: 0
SUM OF ALL RESPONSES TO PHQ QUESTIONS 1-9: 0

## 2022-03-25 ASSESSMENT — SOCIAL DETERMINANTS OF HEALTH (SDOH): HOW HARD IS IT FOR YOU TO PAY FOR THE VERY BASICS LIKE FOOD, HOUSING, MEDICAL CARE, AND HEATING?: NOT HARD AT ALL

## 2022-03-25 NOTE — PROGRESS NOTES
Well Adult Note  Name: Ricardo Garcia Date: 3/28/2022   MRN: [id number] Sex: Male   Age: 62 y.o. Ethnicity: Non- / Non    : 1960 Race: White (non-)      Mary Ellen Alvarez is here for well adult exam.  History: For routine annual exam.     Still noticing redness and inflammation around in sock area. Not new, has discussed. Mildly itchy. Doesn't cause him to itch but after takes socks off feels better to itch. Resolves after socks removed. Feels flexibility is limited. When working has to squat down and when getting up has more problems. Denies having pain. Has taken metamucil for cleansing in the past. However not interested in taking daily. Was taking biotin for nails but felt was not working for him. Changed to collagen. Nails breaking. Review of Systems   Constitutional: Negative for chills, fever and unexpected weight change. Respiratory: Negative for chest tightness. Cardiovascular: Negative for chest pain and palpitations. Gastrointestinal: Negative for diarrhea and nausea. Musculoskeletal: Positive for arthralgias. Negative for back pain and gait problem. Neurological: Negative for dizziness and headaches. Psychiatric/Behavioral: Negative. Allergies   Allergen Reactions    Pcn [Penicillins] Rash         Prior to Visit Medications    Medication Sig Taking?  Authorizing Provider   GLUCOSAMINE-CHONDROITIN PO Take 2 tablets by mouth daily  Yes Historical Provider, MD   COLLAGEN PO Take 1 Scoop by mouth daily  Yes Historical Provider, MD   psyllium (METAMUCIL) 28 % packet Take 1 packet by mouth daily as needed Take with full glass of h20 or juice  Yes Historical Provider, MD         Past Medical History:   Diagnosis Date    Acquired hypothyroidism 2015    Alcohol abuse 2018    Class 1 obesity due to excess calories with serious comorbidity and body mass index (BMI) of 32.0 to 32.9 in adult 2018    JOAQUÍN on CPAP Past Surgical History:   Procedure Laterality Date    COLONOSCOPY  07/25/2016    normal other than diverticulosis    HERNIA REPAIR      LAPAROSCOPY  08/15/2016    Lap repair incarcerated umbilical hernia with implantation 4.5\" diameter Ventralight ST mesh with dr Boo Villarreal         Family History   Problem Relation Age of Onset   Wilbert Bartlett Cancer Sister         Breast    No Known Problems Brother     Thyroid Disease Sister     Cancer Mother         pancreatic    High Blood Pressure Mother     Alzheimer's Disease Father     Parkinsonism Father     High Blood Pressure Father        Social History     Tobacco Use    Smoking status: Former Smoker     Packs/day: 1.00     Years: 30.00     Pack years: 30.00     Types: Cigarettes     Quit date: 9/1/2011     Years since quitting: 10.5    Smokeless tobacco: Never Used   Vaping Use    Vaping Use: Never used   Substance Use Topics    Alcohol use: Yes     Alcohol/week: 141.0 standard drinks     Types: 21 Cans of beer, 120 Standard drinks or equivalent per week    Drug use: No       Objective   /70   Pulse 76   Ht 6' (1.829 m)   Wt 248 lb 9.6 oz (112.8 kg)   SpO2 97%   BMI 33.72 kg/m²   Wt Readings from Last 3 Encounters:   03/25/22 248 lb 9.6 oz (112.8 kg)   04/30/21 239 lb (108.4 kg)   03/19/21 241 lb 6.4 oz (109.5 kg)     There were no vitals filed for this visit. Physical Exam  Constitutional:       General: He is not in acute distress. Appearance: Normal appearance. He is well-developed. Neck:      Vascular: No carotid bruit. Cardiovascular:      Rate and Rhythm: Normal rate and regular rhythm. Heart sounds: Normal heart sounds. Pulmonary:      Effort: Pulmonary effort is normal.      Breath sounds: Normal breath sounds. Abdominal:      General: Bowel sounds are normal.      Palpations: Abdomen is soft. Musculoskeletal:         General: Normal range of motion.    Skin:     General: Skin is warm  DTaP/Tdap/Td vaccine (2 - Td or Tdap) 08/27/2025    Colorectal Cancer Screen  07/25/2026    Hepatitis A vaccine  Aged Out    Hepatitis B vaccine  Aged Out    Hib vaccine  Aged Out    Meningococcal (ACWY) vaccine  Aged Out     Recommendations for [employer] Due: see orders and patient instructions/AVS.    Return in about 1 year (around 3/25/2023) for routine office visit.

## 2022-03-26 LAB
ESTIMATED AVERAGE GLUCOSE: 116.9 MG/DL
HBA1C MFR BLD: 5.7 %

## 2022-03-28 ASSESSMENT — ENCOUNTER SYMPTOMS
NAUSEA: 0
CHEST TIGHTNESS: 0
DIARRHEA: 0
BACK PAIN: 0

## 2022-05-03 ENCOUNTER — TELEPHONE (OUTPATIENT)
Dept: PULMONOLOGY | Age: 62
End: 2022-05-03

## 2022-05-03 NOTE — TELEPHONE ENCOUNTER
Patient cancelled appointment on 05/06/2022 with Mary Jane Bruce CNP for 1 year sleep. Reason: Pt has to work    Patient did reschedule appointment. Appointment rescheduled for 07/21/2022. Last OV 04/30/2021      Assessment:       · Severe JOAQUÍN-Auto CPAP 13-16 CM H2O Optimal compliance and efficacy on review today. · Daily alcohol use  · Obesity     Plan:     -Continue auto CPAP 13-16 CM H2O  -Can send supply order to DME of patient's choice.    -Again discussed recommended cleaning and replacement of CPAP supplies  -Discussed severity of sleep apnea and importance of treatment/CPAP use  -Recommend at least 7, preferably 8 hours of sleep nightly-recommend sleep in bed all night, can set alarm if needed. - Advised to use CPAP 6-8 hrs at night and during naps- continue to increase use. - Replacement of mask, tubing, head straps every 3-6 months or sooner if damaged. - Patient instructed to contact DME company for any mask, tubing or machine trouble shooting if problems arise.  - Sleep hygiene  - Avoid sedatives, alcohol and caffeinated drinks at bed time.  -Advised to cut down/stop daily alcohol use. Discussed effects on alcohol and sleep- initially can worsen JOAQUÍN, and then can cause decrease in total sleep time and increased wakefulness in the second half of sleep  - Patient counseled to never drive or operate heavy machinery while fatigue, drowsy or sleepy.    - Weight loss is recommended as a long-term intervention.    - Complications of JOAQUÍN if not treated were discussed with patient patient, including: systemic hypertension, pulmonary hypertension, cardiovascular morbidities, car accidents and all cause mortality.  -Patient education handout provided regarding sleep tips and CPAP cleaning recommendations

## 2022-07-21 ENCOUNTER — OFFICE VISIT (OUTPATIENT)
Dept: SLEEP MEDICINE | Age: 62
End: 2022-07-21
Payer: COMMERCIAL

## 2022-07-21 VITALS
HEART RATE: 79 BPM | HEIGHT: 72 IN | BODY MASS INDEX: 32.64 KG/M2 | WEIGHT: 241 LBS | DIASTOLIC BLOOD PRESSURE: 92 MMHG | SYSTOLIC BLOOD PRESSURE: 145 MMHG | OXYGEN SATURATION: 97 % | RESPIRATION RATE: 15 BRPM

## 2022-07-21 DIAGNOSIS — E66.9 OBESITY (BMI 30.0-34.9): ICD-10-CM

## 2022-07-21 DIAGNOSIS — Z71.89 CPAP USE COUNSELING: ICD-10-CM

## 2022-07-21 DIAGNOSIS — G47.33 SEVERE OBSTRUCTIVE SLEEP APNEA: Primary | ICD-10-CM

## 2022-07-21 PROCEDURE — 99213 OFFICE O/P EST LOW 20 MIN: CPT | Performed by: NURSE PRACTITIONER

## 2022-07-21 ASSESSMENT — SLEEP AND FATIGUE QUESTIONNAIRES
HOW LIKELY ARE YOU TO NOD OFF OR FALL ASLEEP IN A CAR, WHILE STOPPED FOR A FEW MINUTES IN TRAFFIC: 0
HOW LIKELY ARE YOU TO NOD OFF OR FALL ASLEEP WHILE SITTING AND TALKING TO SOMEONE: 0
HOW LIKELY ARE YOU TO NOD OFF OR FALL ASLEEP WHEN YOU ARE A PASSENGER IN A CAR FOR AN HOUR WITHOUT A BREAK: 0
NECK CIRCUMFERENCE (INCHES): 17
HOW LIKELY ARE YOU TO NOD OFF OR FALL ASLEEP WHILE WATCHING TV: 1
HOW LIKELY ARE YOU TO NOD OFF OR FALL ASLEEP WHILE SITTING INACTIVE IN A PUBLIC PLACE: 0
HOW LIKELY ARE YOU TO NOD OFF OR FALL ASLEEP WHILE SITTING QUIETLY AFTER LUNCH WITHOUT ALCOHOL: 0
HOW LIKELY ARE YOU TO NOD OFF OR FALL ASLEEP WHILE LYING DOWN TO REST IN THE AFTERNOON WHEN CIRCUMSTANCES PERMIT: 2
HOW LIKELY ARE YOU TO NOD OFF OR FALL ASLEEP WHILE SITTING AND READING: 1
ESS TOTAL SCORE: 4

## 2022-07-21 NOTE — PROGRESS NOTES
Patient ID: Alba Carver is a 62 y.o. male who is being seen today for   Chief Complaint   Patient presents with    Sleep Apnea     1 year sleep            HPI:     Alba Carver is a 62 y.o. male in office for JOAQUÍN follow up. States he is doing okay with CPAP. Patient is using CPAP   6 hrs/night. Using humidifier. No snoring on CPAP. The pressure is well tolerated. The mask is comfortable-full face. No mask leak. No significant daytime sleepiness. No nodding off when driving. No dry nose or throat. No fatigue. Bedtime is 1 am and rise time is 7-8 am. Sleep onset is few minutes. Wakes up 0 times at night total. No nocturia. No headache in am. No weight gain. 30-40 oz caffienated beverages during the day. 1-2 beer most nights.  ESS is 4      Sleep Medicine 7/21/2022 4/30/2021 6/10/2019 12/10/2018 12/8/2017 12/27/2016 6/6/2016   Sitting and reading 1 1 0 1 1 1 2   Watching TV 1 1 0 1 0 1 2   Sitting, inactive in a public place (e.g. a theatre or a meeting) 0 0 0 0 0 0 0   As a passenger in a car for an hour without a break 0 0 0 0 0 0 0   Lying down to rest in the afternoon when circumstances permit 2 0 3 3 1 2 2   Sitting and talking to someone 0 0 0 0 0 0 0   Sitting quietly after a lunch without alcohol 0 0 0 0 0 0 2   In a car, while stopped for a few minutes in traffic 0 0 0 0 0 0 0   Total score 4 2 3 5 2 4 8   Neck circumference (Inches) 17 16.5 16 16 16 15.5 15.5       Past Medical History:  Past Medical History:   Diagnosis Date    Acquired hypothyroidism 11/6/2015    Alcohol abuse 7/6/2018    Class 1 obesity due to excess calories with serious comorbidity and body mass index (BMI) of 32.0 to 32.9 in adult 6/21/2018    JOAQUÍN on CPAP        Past Surgical History:        Procedure Laterality Date    COLONOSCOPY  07/25/2016    normal other than diverticulosis    HERNIA REPAIR      LAPAROSCOPY  08/15/2016    Lap repair incarcerated umbilical hernia with implantation 4.5\" diameter Ventralight ST mesh with  delfino     WISDOM TOOTH EXTRACTION  1976       Allergies:  is allergic to pcn [penicillins]. Social History:    TOBACCO:   reports that he quit smoking about 10 years ago. His smoking use included cigarettes. He has a 30.00 pack-year smoking history. He has never used smokeless tobacco.  ETOH:   reports current alcohol use of about 141.0 standard drinks per week. Family History:       Problem Relation Age of Onset    Cancer Sister         Breast    No Known Problems Brother     Thyroid Disease Sister     Cancer Mother         pancreatic    High Blood Pressure Mother     Alzheimer's Disease Father     Parkinsonism Father     High Blood Pressure Father        Current Medications:    Current Outpatient Medications:     GLUCOSAMINE-CHONDROITIN PO, Take 2 tablets by mouth daily , Disp: , Rfl:     COLLAGEN PO, Take 1 Scoop by mouth daily , Disp: , Rfl:     psyllium (METAMUCIL SMOOTH TEXTURE) 28 % packet, Take 1 packet by mouth daily as needed Take with full glass of h20 or juice  (Patient not taking: Reported on 7/21/2022), Disp: , Rfl:       Objective:   PHYSICAL EXAM:    BP (!) 145/92   Pulse 79   Resp 15   Ht 6' (1.829 m)   Wt 241 lb (109.3 kg)   SpO2 97% Comment: RA  BMI 32.69 kg/m²     Physical exam:  Gen: No distress. Obese. BMI of 32.69  Eyes: PERRL. No sclera icterus. No conjunctival injection. ENT: No discharge. Neck: Trachea midline. No obvious mass. Neck circumference 17\"  Resp: No accessory muscle use. No crackles. No wheezes. No rhonchi. CV: Regular rate. Regular rhythm. No murmur or rub. Skin: Warm and dry. M/S: No cyanosis. No obvious joint deformity. Neuro: Awake. Alert. Moves all four extremities. Psych: Oriented x 3. No anxiety. DATA:  Split night PSG 1/21/16- AHI 76 and desaturation to 76%. CPAP titration showed improved sleep related breathing with CPAP.   Recommendation of auto CPAP 13-17 cm H2O    CPAP compliance data:  Compliance download report from 11/8/17 to 12/7/17 showed patient is using machine 5:30 hrs/night with 90% compliance and AHI 1.6 within this time frame. 27/30days with greater than 4 hours of machine use. 90% pressure 13.9 cm H20 on auto CPAP 13-16 cm H2O    Compliance download report from 10/4/18 to 11/2/18 showed patient is using machine 4:09 hrs/night with 13.3% compliance and AHI 2.6 within this time frame. 4/30days with greater than 4 hours of machine use. Large leak 1 hr 29 min nightly. 90% pressure 13.6 cm H20 on auto CPAP 13-16 cm H2O    Compliance download report from 5/11/19 to 6/9/19 reviewed today by me and showed patient is using machine 4:58 hrs/night with 66.7% compliance and AHI 7.3 within this time frame. 20/30days with greater than 4 hours of machine use. 26/30 days with any CPAP use. Large leak 3 hr 16 min/nightly. 90% pressure 13.1 cm H20 13-16 cmH2O.    4/30/21 Unable to download compliance report today. Information obtained from CPAP at bedside and showed patient is using machine 4.9 hrs/night and AHI 1.9 within this time frame. 22/30days with greater than 4 hours of machine use. 90% pressure 15.4 cm H20 on auto CPAP 13-16    Compliance download report from 6/19/22 to 7/18/22 reviewed today by me and showed patient is using machine 5:22 hrs/night with 60% compliance and AHI 1.8 within this time frame. 18/30days with greater than 4 hours of machine use. 90% pressure 15.6 cm H20 on auto CPAP 13-16 cm H2O    Assessment:      Severe JOAQUÍN-Auto CPAP 13-16 CM H2O suboptimal compliance on review today, AHI controlled  Alcohol use  Obesity    Plan:     -Continue auto CPAP 13-16 CM H2O  -Can send supply order to DME of patient's choice.    -Again discussed recommended cleaning and replacement of CPAP supplies  -Discussed severity of sleep apnea and importance of treatment/CPAP use  -Recommend at least 7, preferably 8 hours of sleep nightly-recommend sleep in bed all night, can set alarm if needed.   - Advised to use CPAP 6-8 hrs at night and during naps- continue to increase use. - Replacement of mask, tubing, head straps every 3-6 months or sooner if damaged. - Patient instructed to contact DME company for any mask, tubing or machine trouble shooting if problems arise.  - Sleep hygiene  - Avoid sedatives, alcohol and caffeinated drinks at bed time.  -Advised to cut down/stop daily alcohol use. Discussed effects on alcohol and sleep- initially can worsen JOAQUÍN, and then can cause decrease in total sleep time and increased wakefulness in the second half of sleep  - Patient counseled to never drive or operate heavy machinery while fatigue, drowsy or sleepy. - Weight loss is recommended as a long-term intervention.    - Complications of JOAQUÍN if not treated were discussed with patient patient, including: systemic hypertension, pulmonary hypertension, cardiovascular morbidities, car accidents and all cause mortality.  -Patient education handout provided regarding sleep tips and CPAP cleaning recommendations       -Discussed [employer] recently recalled some Pap units. Patient encouraged to call DME/ to see if his unit is on recall and register it if so. Discussed risks/benefits of untreated sleep apnea as well as risks/benefits of use of unit if recalled. At this time, if patients have moderate to severe sleep apnea or have severe daytime sleepiness, it is recommended continue therapy until the machine can be replaced. Based upon the information we have so far, it appears the risk of stopping therapy may be higher than the risks associated with foam degradation. However, there are still many unknown variables and each patient will have to make a final determination on his or her own. Patient states he plans to continue current CPAP until he can get a replacement unit. Please only use cleaning methods recommended by .       Follow up: 1 year, sooner if needed

## 2022-07-21 NOTE — PATIENT INSTRUCTIONS

## 2023-03-27 ENCOUNTER — OFFICE VISIT (OUTPATIENT)
Dept: FAMILY MEDICINE CLINIC | Age: 63
End: 2023-03-27
Payer: COMMERCIAL

## 2023-03-27 VITALS
DIASTOLIC BLOOD PRESSURE: 78 MMHG | HEART RATE: 87 BPM | OXYGEN SATURATION: 95 % | RESPIRATION RATE: 18 BRPM | BODY MASS INDEX: 32.43 KG/M2 | SYSTOLIC BLOOD PRESSURE: 128 MMHG | HEIGHT: 72 IN | WEIGHT: 239.4 LBS

## 2023-03-27 DIAGNOSIS — Z12.5 PROSTATE CANCER SCREENING: ICD-10-CM

## 2023-03-27 DIAGNOSIS — R79.89 ELEVATED TSH: ICD-10-CM

## 2023-03-27 DIAGNOSIS — R73.01 IMPAIRED FASTING GLUCOSE: ICD-10-CM

## 2023-03-27 DIAGNOSIS — Z00.00 ENCOUNTER FOR WELL ADULT EXAM WITHOUT ABNORMAL FINDINGS: Primary | ICD-10-CM

## 2023-03-27 LAB
ALBUMIN SERPL-MCNC: 4.4 G/DL (ref 3.4–5)
ALBUMIN/GLOB SERPL: 2.3 {RATIO} (ref 1.1–2.2)
ALP SERPL-CCNC: 98 U/L (ref 40–129)
ALT SERPL-CCNC: 19 U/L (ref 10–40)
ANION GAP SERPL CALCULATED.3IONS-SCNC: 11 MMOL/L (ref 3–16)
AST SERPL-CCNC: 15 U/L (ref 15–37)
BILIRUB SERPL-MCNC: 0.4 MG/DL (ref 0–1)
BUN SERPL-MCNC: 18 MG/DL (ref 7–20)
CALCIUM SERPL-MCNC: 9.7 MG/DL (ref 8.3–10.6)
CHLORIDE SERPL-SCNC: 109 MMOL/L (ref 99–110)
CO2 SERPL-SCNC: 24 MMOL/L (ref 21–32)
CREAT SERPL-MCNC: 0.8 MG/DL (ref 0.8–1.3)
GFR SERPLBLD CREATININE-BSD FMLA CKD-EPI: >60 ML/MIN/{1.73_M2}
GLUCOSE SERPL-MCNC: 110 MG/DL (ref 70–99)
POTASSIUM SERPL-SCNC: 5.2 MMOL/L (ref 3.5–5.1)
PROT SERPL-MCNC: 6.3 G/DL (ref 6.4–8.2)
PSA SERPL DL<=0.01 NG/ML-MCNC: 0.74 NG/ML (ref 0–4)
SODIUM SERPL-SCNC: 144 MMOL/L (ref 136–145)
TSH SERPL DL<=0.005 MIU/L-ACNC: 3.33 UIU/ML (ref 0.27–4.2)

## 2023-03-27 PROCEDURE — 99396 PREV VISIT EST AGE 40-64: CPT | Performed by: NURSE PRACTITIONER

## 2023-03-27 ASSESSMENT — ENCOUNTER SYMPTOMS
DIARRHEA: 0
BACK PAIN: 0
CHEST TIGHTNESS: 0
NAUSEA: 0

## 2023-03-27 ASSESSMENT — PATIENT HEALTH QUESTIONNAIRE - PHQ9
SUM OF ALL RESPONSES TO PHQ QUESTIONS 1-9: 0
SUM OF ALL RESPONSES TO PHQ QUESTIONS 1-9: 0
SUM OF ALL RESPONSES TO PHQ9 QUESTIONS 1 & 2: 0
2. FEELING DOWN, DEPRESSED OR HOPELESS: 0
1. LITTLE INTEREST OR PLEASURE IN DOING THINGS: 0
SUM OF ALL RESPONSES TO PHQ QUESTIONS 1-9: 0
SUM OF ALL RESPONSES TO PHQ QUESTIONS 1-9: 0

## 2023-03-27 NOTE — PROGRESS NOTES
DTaP/Tdap/Td vaccine (2 - Td or Tdap) 08/27/2025    Colorectal Cancer Screen  07/25/2026    Hepatitis A vaccine  Aged Out    Hib vaccine  Aged Out    Meningococcal (ACWY) vaccine  Aged Out    Pneumococcal 0-64 years Vaccine  Aged Out     Recommendations for [place] Due: see orders and patient instructions/AVS.    Return in about 1 year (around 3/27/2024) for annual physical exam.

## 2023-03-28 LAB
EST. AVERAGE GLUCOSE BLD GHB EST-MCNC: 114 MG/DL
HBA1C MFR BLD: 5.6 %

## 2023-07-20 ENCOUNTER — OFFICE VISIT (OUTPATIENT)
Dept: SLEEP MEDICINE | Age: 63
End: 2023-07-20
Payer: COMMERCIAL

## 2023-07-20 VITALS
OXYGEN SATURATION: 96 % | BODY MASS INDEX: 32.23 KG/M2 | HEART RATE: 73 BPM | HEIGHT: 72 IN | WEIGHT: 238 LBS | SYSTOLIC BLOOD PRESSURE: 135 MMHG | DIASTOLIC BLOOD PRESSURE: 80 MMHG | RESPIRATION RATE: 16 BRPM

## 2023-07-20 DIAGNOSIS — E66.9 OBESITY (BMI 30.0-34.9): ICD-10-CM

## 2023-07-20 DIAGNOSIS — G47.33 SEVERE OBSTRUCTIVE SLEEP APNEA: Primary | ICD-10-CM

## 2023-07-20 DIAGNOSIS — Z71.89 CPAP USE COUNSELING: ICD-10-CM

## 2023-07-20 PROCEDURE — 99214 OFFICE O/P EST MOD 30 MIN: CPT | Performed by: NURSE PRACTITIONER

## 2023-07-20 ASSESSMENT — SLEEP AND FATIGUE QUESTIONNAIRES
HOW LIKELY ARE YOU TO NOD OFF OR FALL ASLEEP WHILE SITTING QUIETLY AFTER LUNCH WITHOUT ALCOHOL: 0
HOW LIKELY ARE YOU TO NOD OFF OR FALL ASLEEP WHILE SITTING AND TALKING TO SOMEONE: 0
HOW LIKELY ARE YOU TO NOD OFF OR FALL ASLEEP WHEN YOU ARE A PASSENGER IN A CAR FOR AN HOUR WITHOUT A BREAK: 0
ESS TOTAL SCORE: 3
HOW LIKELY ARE YOU TO NOD OFF OR FALL ASLEEP WHILE SITTING AND READING: 1
HOW LIKELY ARE YOU TO NOD OFF OR FALL ASLEEP WHILE SITTING INACTIVE IN A PUBLIC PLACE: 0
HOW LIKELY ARE YOU TO NOD OFF OR FALL ASLEEP WHILE LYING DOWN TO REST IN THE AFTERNOON WHEN CIRCUMSTANCES PERMIT: 0
HOW LIKELY ARE YOU TO NOD OFF OR FALL ASLEEP WHILE WATCHING TV: 2
NECK CIRCUMFERENCE (INCHES): 15
HOW LIKELY ARE YOU TO NOD OFF OR FALL ASLEEP IN A CAR, WHILE STOPPED FOR A FEW MINUTES IN TRAFFIC: 0

## 2023-07-20 NOTE — PROGRESS NOTES
Patient ID: Alfa West is a 65 y.o. male who is being seen today for   Chief Complaint   Patient presents with    Follow-up     1 yr sleep cr scanned            HPI:   Alfa West is a 65 y.o. male in office for JOAQUÍN follow up. States he is doing okay with CPAP. States he is having trouble with Xand. Patient is using CPAP  5-6 hrs/night. Using humidifier. No snoring on CPAP. The pressure is well tolerated. The mask is comfortable- full face. No mask leak. No significant daytime sleepiness. No nodding off when driving. No dry nose or throat. No fatigue. Bedtime is 1 am and rise time is 8 am. Sleep onset is few minutes. Wakes up 0 times at night total. No nocturia. Occasional naps during the day. No headache in am. No weight gain. 36-48 oz caffienated beverages during the day. 1-2 beer most nights.  ESS is 3        Sleep Medicine 7/20/2023 7/21/2022 4/30/2021 6/10/2019 12/10/2018 12/8/2017 12/27/2016   Sitting and reading 1 1 1 0 1 1 1   Watching TV 2 1 1 0 1 0 1   Sitting, inactive in a public place (e.g. a theatre or a meeting) 0 0 0 0 0 0 0   As a passenger in a car for an hour without a break 0 0 0 0 0 0 0   Lying down to rest in the afternoon when circumstances permit 0 2 0 3 3 1 2   Sitting and talking to someone 0 0 0 0 0 0 0   Sitting quietly after a lunch without alcohol 0 0 0 0 0 0 0   In a car, while stopped for a few minutes in traffic 0 0 0 0 0 0 0   [place] Sleepiness Score 3 4 2 3 5 2 4   Neck circumference (Inches) 15 17 16.5 16 16 16 15.5       Past Medical History:  Past Medical History:   Diagnosis Date    Acquired hypothyroidism 11/06/2015    Alcohol abuse 07/06/2018    Class 1 obesity due to excess calories with serious comorbidity and body mass index (BMI) of 32.0 to 32.9 in adult 06/21/2018    JOAQUÍN on CPAP        Past Surgical History:        Procedure Laterality Date    COLONOSCOPY  07/25/2016    normal other than diverticulosis    HERNIA REPAIR      LAPAROSCOPY  08/15/2016    Lap

## 2024-07-25 ENCOUNTER — OFFICE VISIT (OUTPATIENT)
Dept: SLEEP MEDICINE | Age: 64
End: 2024-07-25
Payer: COMMERCIAL

## 2024-07-25 ENCOUNTER — TELEPHONE (OUTPATIENT)
Dept: SLEEP MEDICINE | Age: 64
End: 2024-07-25

## 2024-07-25 VITALS
BODY MASS INDEX: 34.72 KG/M2 | WEIGHT: 248 LBS | HEIGHT: 71 IN | DIASTOLIC BLOOD PRESSURE: 74 MMHG | SYSTOLIC BLOOD PRESSURE: 138 MMHG | HEART RATE: 68 BPM | OXYGEN SATURATION: 97 %

## 2024-07-25 DIAGNOSIS — Z71.89 CPAP USE COUNSELING: ICD-10-CM

## 2024-07-25 DIAGNOSIS — G47.33 SEVERE OBSTRUCTIVE SLEEP APNEA: Primary | ICD-10-CM

## 2024-07-25 DIAGNOSIS — E66.9 OBESITY (BMI 30.0-34.9): ICD-10-CM

## 2024-07-25 PROCEDURE — 99213 OFFICE O/P EST LOW 20 MIN: CPT | Performed by: NURSE PRACTITIONER

## 2024-07-26 ENCOUNTER — TELEPHONE (OUTPATIENT)
Dept: PULMONOLOGY | Age: 64
End: 2024-07-26

## 2024-07-26 NOTE — TELEPHONE ENCOUNTER
Faxed pressure change order, full face mask order, CR, and ov notes to [facility] at [phone] via [employer].

## 2024-08-26 NOTE — TELEPHONE ENCOUNTER
CPAP download report from 7/27/2024 - 8/25/2024 on auto CPAP 14-18 cm H2O reviewed.  Compliance is good 83%.  AHI is good 1.5.

## 2025-07-24 ENCOUNTER — TELEPHONE (OUTPATIENT)
Dept: SLEEP MEDICINE | Age: 65
End: 2025-07-24

## 2025-07-24 ENCOUNTER — OFFICE VISIT (OUTPATIENT)
Dept: SLEEP MEDICINE | Age: 65
End: 2025-07-24
Payer: COMMERCIAL

## 2025-07-24 VITALS
HEART RATE: 70 BPM | SYSTOLIC BLOOD PRESSURE: 142 MMHG | OXYGEN SATURATION: 96 % | HEIGHT: 71 IN | BODY MASS INDEX: 32.98 KG/M2 | DIASTOLIC BLOOD PRESSURE: 86 MMHG | WEIGHT: 235.6 LBS

## 2025-07-24 DIAGNOSIS — G47.33 SEVERE OBSTRUCTIVE SLEEP APNEA: Primary | ICD-10-CM

## 2025-07-24 DIAGNOSIS — E66.811 OBESITY (BMI 30.0-34.9): ICD-10-CM

## 2025-07-24 DIAGNOSIS — Z71.89 CPAP USE COUNSELING: ICD-10-CM

## 2025-07-24 PROCEDURE — 99213 OFFICE O/P EST LOW 20 MIN: CPT | Performed by: NURSE PRACTITIONER

## 2025-07-24 ASSESSMENT — SLEEP AND FATIGUE QUESTIONNAIRES
HOW LIKELY ARE YOU TO NOD OFF OR FALL ASLEEP WHILE SITTING INACTIVE IN A PUBLIC PLACE: WOULD NEVER DOZE
HOW LIKELY ARE YOU TO NOD OFF OR FALL ASLEEP WHILE SITTING QUIETLY AFTER LUNCH WITHOUT ALCOHOL: MODERATE CHANCE OF DOZING
HOW LIKELY ARE YOU TO NOD OFF OR FALL ASLEEP WHILE LYING DOWN TO REST IN THE AFTERNOON WHEN CIRCUMSTANCES PERMIT: SLIGHT CHANCE OF DOZING
HOW LIKELY ARE YOU TO NOD OFF OR FALL ASLEEP WHEN YOU ARE A PASSENGER IN A CAR FOR AN HOUR WITHOUT A BREAK: WOULD NEVER DOZE
HOW LIKELY ARE YOU TO NOD OFF OR FALL ASLEEP IN A CAR, WHILE STOPPED FOR A FEW MINUTES IN TRAFFIC: WOULD NEVER DOZE
ESS TOTAL SCORE: 6
HOW LIKELY ARE YOU TO NOD OFF OR FALL ASLEEP WHILE WATCHING TV: MODERATE CHANCE OF DOZING
HOW LIKELY ARE YOU TO NOD OFF OR FALL ASLEEP WHILE SITTING AND READING: SLIGHT CHANCE OF DOZING
HOW LIKELY ARE YOU TO NOD OFF OR FALL ASLEEP WHILE SITTING AND TALKING TO SOMEONE: WOULD NEVER DOZE

## 2025-07-24 NOTE — PROGRESS NOTES
Patient ID: Abelardo Villanueva is a 64 y.o. male who is being seen today for   Chief Complaint   Patient presents with    Follow-up    Sleep Apnea           HPI:   Abelardo Villanueva is a 64 y.o. male in office for JOAQUÍN follow up. 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  States he is still getting used to 3rd shift.   States he falls asleep watching TV, then goes to bed later.   Patient is using CPAP   4-5 hrs/night. Using humidifier. No snoring on CPAP. The pressure is well tolerated-better since increase at last visit. The mask is comfortable-full face with mask liners. No mask leak. No significant daytime sleepiness. No nodding off when driving. No dry nose or throat. No fatigue. Bedtime is 4-5 pm and rise time is 7-8 pm, does sleep on couch for a period of time prior.  Sleep onset is few minutes. Wakes up 0 times at night total. No nocturia. No headache in am. No weight gain. 4-5 caffienated beverages during the day. 1-2 beer most days. ESS is 6            7/24/2025    12:59 PM 7/25/2024     1:24 PM 7/20/2023     1:39 PM 7/21/2022     1:53 PM 4/30/2021    10:23 AM 6/10/2019    10:37 AM 12/10/2018     8:40 AM   Sleep Medicine   Sitting and reading 1 1 1 1 1 0 1   Watching TV 2 1 2 1 1 0 1   Sitting, inactive in a public place (e.g. a theatre or a meeting) 0 0 0 0 0 0 0   As a passenger in a car for an hour without a break 0 0 0 0 0 0 0   Lying down to rest in the afternoon when circumstances permit 1 2 0 2 0 3 3   Sitting and talking to someone 0 0 0 0 0 0 0   Sitting quietly after a lunch without alcohol 2 0 0 0 0 0 0   In a car, while stopped for a few minutes in traffic 0 0 0 0 0 0 0   [place] Sleepiness Score 6 4 3 4 2 3 5   Neck (Inches) 16 16.5 15 17 16.5 16 16       Past Medical History:  Past Medical History:   Diagnosis Date    Acquired hypothyroidism 11/06/2015    Alcohol abuse 07/06/2018    Class 1 obesity due to excess calories with serious comorbidity and body mass index (BMI) of 32.0 to 32.9 in